# Patient Record
Sex: MALE | Race: WHITE
[De-identification: names, ages, dates, MRNs, and addresses within clinical notes are randomized per-mention and may not be internally consistent; named-entity substitution may affect disease eponyms.]

---

## 2017-04-09 ENCOUNTER — HOSPITAL ENCOUNTER (OUTPATIENT)
Dept: HOSPITAL 53 - M ED | Age: 82
Setting detail: OBSERVATION
LOS: 3 days | Discharge: HOME | End: 2017-04-12
Attending: HOSPITALIST | Admitting: INTERNAL MEDICINE
Payer: MEDICARE

## 2017-04-09 VITALS — WEIGHT: 175.71 LBS | HEIGHT: 72 IN | BODY MASS INDEX: 23.8 KG/M2

## 2017-04-09 DIAGNOSIS — D61.818: Primary | ICD-10-CM

## 2017-04-09 DIAGNOSIS — I50.43: ICD-10-CM

## 2017-04-09 DIAGNOSIS — I35.8: ICD-10-CM

## 2017-04-09 DIAGNOSIS — Z85.828: ICD-10-CM

## 2017-04-09 DIAGNOSIS — N18.3: ICD-10-CM

## 2017-04-09 DIAGNOSIS — Z79.899: ICD-10-CM

## 2017-04-09 DIAGNOSIS — E03.9: ICD-10-CM

## 2017-04-09 DIAGNOSIS — R55: ICD-10-CM

## 2017-04-09 DIAGNOSIS — Z79.02: ICD-10-CM

## 2017-04-09 DIAGNOSIS — R06.02: ICD-10-CM

## 2017-04-09 DIAGNOSIS — K57.30: ICD-10-CM

## 2017-04-09 DIAGNOSIS — I48.91: ICD-10-CM

## 2017-04-09 DIAGNOSIS — I12.9: ICD-10-CM

## 2017-04-09 DIAGNOSIS — E78.4: ICD-10-CM

## 2017-04-09 LAB
ANION GAP SERPL CALC-SCNC: 8 MEQ/L (ref 8–16)
BASOPHILS # BLD AUTO: 0 K/MM3 (ref 0–0.2)
BASOPHILS NFR BLD AUTO: 0.4 % (ref 0–1)
BUN SERPL-MCNC: 25 MG/DL (ref 7–18)
CALCIUM SERPL-MCNC: 8.5 MG/DL (ref 8.8–10.2)
CHLORIDE SERPL-SCNC: 107 MEQ/L (ref 98–107)
CO2 SERPL-SCNC: 27 MEQ/L (ref 21–32)
CREAT SERPL-MCNC: 1.52 MG/DL (ref 0.7–1.3)
EOSINOPHIL # BLD AUTO: 0 K/MM3 (ref 0–0.5)
EOSINOPHIL NFR BLD AUTO: 1.8 % (ref 0–3)
ERYTHROCYTE [DISTWIDTH] IN BLOOD BY AUTOMATED COUNT: 15.4 % (ref 11.5–14.5)
GFR SERPL CREATININE-BSD FRML MDRD: 46.7 ML/MIN/{1.73_M2} (ref 35–?)
GLUCOSE SERPL-MCNC: 139 MG/DL (ref 83–110)
LARGE UNSTAINED CELL #: 0.1 K/MM3 (ref 0–0.4)
LARGE UNSTAINED CELL %: 2.7 % (ref 0–4)
LYMPHOCYTES # BLD AUTO: 0.7 K/MM3 (ref 1.5–4.5)
LYMPHOCYTES NFR BLD AUTO: 20.6 % (ref 24–44)
MCH RBC QN AUTO: 34.1 PG (ref 27–33)
MCHC RBC AUTO-ENTMCNC: 32.5 G/DL (ref 32–36.5)
MCV RBC AUTO: 104.8 FL (ref 80–96)
MONOCYTES # BLD AUTO: 0.4 K/MM3 (ref 0–0.8)
MONOCYTES NFR BLD AUTO: 12.8 % (ref 0–5)
NEUTROPHILS # BLD AUTO: 1.9 K/MM3 (ref 1.8–7.7)
NEUTROPHILS NFR BLD AUTO: 61.7 % (ref 36–66)
PLATELET # BLD AUTO: 131 K/MM3 (ref 150–450)
POTASSIUM SERPL-SCNC: 3.5 MEQ/L (ref 3.5–5.1)
SODIUM SERPL-SCNC: 142 MEQ/L (ref 136–145)
WBC # BLD AUTO: 3.1 K/MM3 (ref 4–10)

## 2017-04-09 PROCEDURE — 82550 ASSAY OF CK (CPK): CPT

## 2017-04-09 PROCEDURE — 80048 BASIC METABOLIC PNL TOTAL CA: CPT

## 2017-04-09 PROCEDURE — 80053 COMPREHEN METABOLIC PANEL: CPT

## 2017-04-09 PROCEDURE — 94760 N-INVAS EAR/PLS OXIMETRY 1: CPT

## 2017-04-09 PROCEDURE — 93041 RHYTHM ECG TRACING: CPT

## 2017-04-09 PROCEDURE — 93880 EXTRACRANIAL BILAT STUDY: CPT

## 2017-04-09 PROCEDURE — 83735 ASSAY OF MAGNESIUM: CPT

## 2017-04-09 PROCEDURE — 85027 COMPLETE CBC AUTOMATED: CPT

## 2017-04-09 PROCEDURE — 83605 ASSAY OF LACTIC ACID: CPT

## 2017-04-09 PROCEDURE — 81001 URINALYSIS AUTO W/SCOPE: CPT

## 2017-04-09 PROCEDURE — 80069 RENAL FUNCTION PANEL: CPT

## 2017-04-09 PROCEDURE — 96374 THER/PROPH/DIAG INJ IV PUSH: CPT

## 2017-04-09 PROCEDURE — 36415 COLL VENOUS BLD VENIPUNCTURE: CPT

## 2017-04-09 PROCEDURE — 93306 TTE W/DOPPLER COMPLETE: CPT

## 2017-04-09 PROCEDURE — 96376 TX/PRO/DX INJ SAME DRUG ADON: CPT

## 2017-04-09 PROCEDURE — 86901 BLOOD TYPING SEROLOGIC RH(D): CPT

## 2017-04-09 PROCEDURE — 83880 ASSAY OF NATRIURETIC PEPTIDE: CPT

## 2017-04-09 PROCEDURE — 36430 TRANSFUSION BLD/BLD COMPNT: CPT

## 2017-04-09 PROCEDURE — 71010: CPT

## 2017-04-09 PROCEDURE — 86920 COMPATIBILITY TEST SPIN: CPT

## 2017-04-09 PROCEDURE — 99285 EMERGENCY DEPT VISIT HI MDM: CPT

## 2017-04-09 PROCEDURE — 84484 ASSAY OF TROPONIN QUANT: CPT

## 2017-04-09 PROCEDURE — 86900 BLOOD TYPING SEROLOGIC ABO: CPT

## 2017-04-09 PROCEDURE — 82553 CREATINE MB FRACTION: CPT

## 2017-04-09 PROCEDURE — 95819 EEG AWAKE AND ASLEEP: CPT

## 2017-04-09 PROCEDURE — 86850 RBC ANTIBODY SCREEN: CPT

## 2017-04-09 PROCEDURE — 93005 ELECTROCARDIOGRAM TRACING: CPT

## 2017-04-09 PROCEDURE — 70450 CT HEAD/BRAIN W/O DYE: CPT

## 2017-04-09 PROCEDURE — 84443 ASSAY THYROID STIM HORMONE: CPT

## 2017-04-09 PROCEDURE — 85025 COMPLETE CBC W/AUTO DIFF WBC: CPT

## 2017-04-09 PROCEDURE — 71275 CT ANGIOGRAPHY CHEST: CPT

## 2017-04-09 NOTE — REPUSA
CT angiogram of the chest

Clinical statement: Chest pain and shortness of breath.

Technique: Multiple axial CT images were obtained from the thoracic inlet through the upper abdomen a
fter a bolus administration of nonionic intravenous contrast. Coronal and sagittal reconstructions we
re also obtained.

Comparison: 11/16/2016.

Findings: The pulmonary arteries are well-opacified with contrast, with no intraluminal filling defec
ts to suggest embolism. The thoracic aorta is unremarkable., Other than moderate atherosclerotic lanier
ges Thyroid gland is within normal limits. There is no thoracic lymphadenopathy. There are moderate s
ized bilateral pleural effusions. The lungs are clear. Limited imaging of the upper abdomen is unrema
rkable. There are no suspicious osseous lesions.

Impression:

1. No evidence of pulmonary embolism.

2. Moderate sized bilateral pleural effusions.

3. Moderate atherosclerosis of the thoracic aorta. No evidence of aneurysm.

     Electronically signed by KAYA TAMEZ MD on 04/09/2017 10:45:15 PM ET

## 2017-04-10 VITALS — SYSTOLIC BLOOD PRESSURE: 120 MMHG | DIASTOLIC BLOOD PRESSURE: 78 MMHG

## 2017-04-10 VITALS — DIASTOLIC BLOOD PRESSURE: 79 MMHG | SYSTOLIC BLOOD PRESSURE: 140 MMHG

## 2017-04-10 VITALS — SYSTOLIC BLOOD PRESSURE: 140 MMHG | DIASTOLIC BLOOD PRESSURE: 56 MMHG

## 2017-04-10 VITALS — DIASTOLIC BLOOD PRESSURE: 52 MMHG | SYSTOLIC BLOOD PRESSURE: 130 MMHG

## 2017-04-10 VITALS — DIASTOLIC BLOOD PRESSURE: 57 MMHG | SYSTOLIC BLOOD PRESSURE: 104 MMHG

## 2017-04-10 VITALS — DIASTOLIC BLOOD PRESSURE: 68 MMHG | SYSTOLIC BLOOD PRESSURE: 132 MMHG

## 2017-04-10 VITALS — DIASTOLIC BLOOD PRESSURE: 66 MMHG | SYSTOLIC BLOOD PRESSURE: 139 MMHG

## 2017-04-10 VITALS — SYSTOLIC BLOOD PRESSURE: 127 MMHG | DIASTOLIC BLOOD PRESSURE: 58 MMHG

## 2017-04-10 VITALS — SYSTOLIC BLOOD PRESSURE: 142 MMHG | DIASTOLIC BLOOD PRESSURE: 81 MMHG

## 2017-04-10 VITALS — SYSTOLIC BLOOD PRESSURE: 115 MMHG | DIASTOLIC BLOOD PRESSURE: 68 MMHG

## 2017-04-10 VITALS — DIASTOLIC BLOOD PRESSURE: 81 MMHG | SYSTOLIC BLOOD PRESSURE: 133 MMHG

## 2017-04-10 VITALS — DIASTOLIC BLOOD PRESSURE: 68 MMHG | SYSTOLIC BLOOD PRESSURE: 115 MMHG

## 2017-04-10 VITALS — SYSTOLIC BLOOD PRESSURE: 116 MMHG | DIASTOLIC BLOOD PRESSURE: 64 MMHG

## 2017-04-10 LAB
ALBUMIN SERPL BCG-MCNC: 3.4 GM/DL (ref 3.2–5.2)
ALBUMIN/GLOB SERPL: 1.03 {RATIO} (ref 1–1.93)
ALP SERPL-CCNC: 87 U/L (ref 45–117)
ALT SERPL W P-5'-P-CCNC: 41 U/L (ref 12–78)
ANION GAP SERPL CALC-SCNC: 8 MEQ/L (ref 8–16)
AST SERPL-CCNC: 24 U/L (ref 15–37)
BILIRUB SERPL-MCNC: 0.6 MG/DL (ref 0.2–1)
BUN SERPL-MCNC: 21 MG/DL (ref 7–18)
CALCIUM SERPL-MCNC: 8.8 MG/DL (ref 8.8–10.2)
CHLORIDE SERPL-SCNC: 107 MEQ/L (ref 98–107)
CO2 SERPL-SCNC: 29 MEQ/L (ref 21–32)
CREAT SERPL-MCNC: 1.67 MG/DL (ref 0.7–1.3)
ERYTHROCYTE [DISTWIDTH] IN BLOOD BY AUTOMATED COUNT: 16.5 % (ref 11.5–14.5)
GFR SERPL CREATININE-BSD FRML MDRD: 41.9 ML/MIN/{1.73_M2} (ref 35–?)
GLUCOSE SERPL-MCNC: 130 MG/DL (ref 83–110)
MAGNESIUM SERPL-MCNC: 2.3 MG/DL (ref 1.8–2.4)
MCH RBC QN AUTO: 33.2 PG (ref 27–33)
MCHC RBC AUTO-ENTMCNC: 33.6 G/DL (ref 32–36.5)
MCV RBC AUTO: 98.9 FL (ref 80–96)
PLATELET # BLD AUTO: 142 K/MM3 (ref 150–450)
POTASSIUM SERPL-SCNC: 3.6 MEQ/L (ref 3.5–5.1)
PROT SERPL-MCNC: 6.7 GM/DL (ref 6.4–8.2)
SODIUM SERPL-SCNC: 144 MEQ/L (ref 136–145)
WBC # BLD AUTO: 3.7 K/MM3 (ref 4–10)

## 2017-04-10 RX ADMIN — LEVOTHYROXINE SODIUM SCH MG: 25 TABLET ORAL at 05:24

## 2017-04-10 RX ADMIN — RIVAROXABAN SCH MG: 15 TABLET, FILM COATED ORAL at 20:55

## 2017-04-10 RX ADMIN — DOCUSATE SODIUM,SENNOSIDES SCH TAB: 50; 8.6 TABLET, FILM COATED ORAL at 09:05

## 2017-04-10 RX ADMIN — SODIUM CHLORIDE, PRESERVATIVE FREE SCH ML: 5 INJECTION INTRAVENOUS at 13:12

## 2017-04-10 RX ADMIN — DOCUSATE SODIUM,SENNOSIDES SCH TAB: 50; 8.6 TABLET, FILM COATED ORAL at 20:55

## 2017-04-10 RX ADMIN — MULTIPLE VITAMINS W/ MINERALS TAB SCH TAB: TAB at 09:05

## 2017-04-10 RX ADMIN — FUROSEMIDE SCH MG: 10 INJECTION, SOLUTION INTRAMUSCULAR; INTRAVENOUS at 17:10

## 2017-04-10 RX ADMIN — METOPROLOL SUCCINATE SCH MG: 25 TABLET, EXTENDED RELEASE ORAL at 09:05

## 2017-04-10 RX ADMIN — SODIUM CHLORIDE, PRESERVATIVE FREE SCH ML: 5 INJECTION INTRAVENOUS at 20:55

## 2017-04-10 RX ADMIN — ATORVASTATIN CALCIUM SCH MG: 10 TABLET, FILM COATED ORAL at 09:05

## 2017-04-10 NOTE — CR
DATE OF CONSULTATION:  04/10/2017

 

REFERRING PHYSICIAN:  Ezekiel Solis MD

 

INDICATION:  Near syncope.

 

HISTORY OF PRESENT ILLNESS:  Mr. Willett is known to me.  He is a very pleasant

84-year-old man who has known chronic atrial fibrillation and valvular heart

disease.  He was admitted to [facility] ([facility]) yesterday evening

after he suffered a near syncopal event at home.  He apparently was watching

television. While sitting he was feeling well. Then he stood up and as he did 
he suddenly 

experienced dizziness, chest discomfort and shortness of breath.  He sat back 
down on 

a chair but apparently it did not prevent him from almost losing consciousness.
  

His wife witnessed the episode.  He supposedly was rolling his eyes back and 
was 

gasping for air so she called an ambulance.  By the time they arrived he was 
already feeling 

a little better.  The chest discomfort essentially completely resolved by the 
time ambulance 

arrived, but he still was somewhat incoherent and short of breath.  They 
applied oxygen and

placed IV and by the time he reached emergency room there was already marked

improvement in his condition.  Reportedly the initial blood pressure reading by

the ambulance was very low.  I was unable to find documentation in the chart.

After arrival to emergency room he underwent a variety of tests.  He was found 
to

be in atrial fibrillation on ECG which is known to be chronic, the heart rate 
was

reasonably well-controlled.  The CT of the chest looking for pulmonary embolism

was negative for pulmonary embolism but revealed bilateral pleural effusions.  
He

was also found to be markedly anemic and since yesterday received 2 units of

packed red blood cells.  Today he tells me that he is feeling much better but

still tired and exhausted.  So far the telemetry monitoring did not reveal any

extreme bradycardia or tachycardia.

 

PAST MEDICAL HISTORY:

1.  Valvular heart disease.  He had an echocardiogram in 2017 in our 
office

which revealed left ventricle ejection fraction about 50-55%.  There was 
moderate

aortic insufficiency and moderately severe mitral insufficiency and at least 
mild

pulmonary hypertension.

2.  The patient does not have established coronary artery disease.  Last

evaluation for ischemia was an exercise stress test in 2013 which was

negative at 5 metabolic equivalents.

3.  Chronic anemia, under care of hematology service.  Based on the available

information it appears to me that he most likely has myelodysplastic syndrome.

There is no history of gastrointestinal (GI) bleeding.

4.  Hypertension.

5.  Hypothyroidism.

6.  Chronic atrial fibrillation since at least .

 

SURGICAL HISTORY:  Positive for hernia repair and skin cancer resections.  He 
had

several colonoscopies.

 

OUTPATIENT MEDICATIONS:

- furosemide 40 mg a day

- levothyroxine 25 mcg a day

- Lipitor 10 a day

- stool softener

- Toprol XL as needed once daily for heart rate over 70 beats per minute

- Xarelto 50 mg a day

- multivitamin

 

ALLERGIES:  No medication allergies.

 

FAMILY HISTORY:  Negative for early coronary artery disease (CAD) in first-
degree

relatives.  His mother  of noncardiac issues in advanced age.

 

SOCIAL HISTORY:  The patient is  and lives with his wife.  He is retired.

There is no history of drug use.  He used to smoke but quit in .  No

significant alcohol use.

 

REVIEW OF SYSTEMS:

Prior to the onset of symptoms, he has not had any unusual events lately.

Specifically denies any recent fever, chills, nausea, vomiting, diarrhea.  There

is no history of stroke.  There is no history of chest discomfort until the one

he had during the episode.  He does have chronic shortness of breath of variable

degree.  He does have occasional dizziness with ambulation.  No peripheral 
edema.

No recent syncope or near-syncope.  The rest of review of systems is negative.

 

PHYSICAL EXAMINATION:

Reveals elderly man that does not appear to be chronically ill or acutely ill.

He lays in hospital bed.  Last set of vital signs:  Blood pressure 140/56, heart

rate is 70s, he is afebrile, saturation is 97% on room air.  His jugular venous

pressure (JVP) is not elevated.  Lungs are relatively clear to auscultation

although there are diminished breath sounds over both bases.  Heart exam reveals

irregular rhythm.  There is a blowing murmur best heard in the axilla about 3/6

intensity and there is also murmur over the aortic valve.  I do not appreciate a

diastolic murmur of aortic insufficiency.  Abdomen is soft, nontender.  No

hepatosplenomegaly is appreciated.  There is trace peripheral edema.  Peripheral

pulses are palpable.  Neurologically he is alert and oriented.  He does have

occasional difficulty finding words.  No focal deficit as far as muscle strength

is concerned.

 

LABORATORY DATA:

As of today CBC reveals hemoglobin 8.9, hematocrit 26 and platelet count 142,
000.

Basic metabolic panel:  Potassium 3.6 BUN 21, creatinine 1.7, and glucose 130.

Normal liver function tests.  Normal cardiac enzymes and albumin is 3.4.

 

IMAGING:

He had bilateral carotid ultrasound that revealed less than 50% bilateral

stenosis.

 

He had a head CT that did not reveal any acute process.  There is chronic 
atrophy

and small-vessel changes consistent with his age.

 

CT angiography of the chest evidenced bilateral pleural effusions and

cardiomegaly but no pulmonary embolism.  Similar is for chest x-ray.

 

ECG reveals atrial fibrillation which is known to be chronic.  There is

occasional ventricular ectopy, nonspecific IVCD, and chronic repolarization

abnormalities.

 

ASSESSMENT/PLAN:

Mr. Willett is an 84-year-old man who has chronic atrial fibrillation and 
likely

myelodysplastic syndrome who presented with chest pain, shortness of breath, and

near syncope when he tried to stand up.  The history sounds orthostatic with 
very

likely strong contribution of his anemia.  Nevertheless with chronic atrial

fibrillation, I certainly cannot rule out component of arrhythmia and

consequently I do recommend that the patient gets monitored on telemetry for

minimum of 2-3 days.  I also would want to make sure that before he gets

discharged home he is able to ambulate and also that he maintains his 
hemoglobin.

So far the dose of diuretics was doubled which I think is appropriate.  It is

undoubtedly due to combination of severe anemia with valvular heart disease.

Unfortunately I do not believe that the patient is a candidate for aortic and

mitral valve replacement and consequently we will be left with purely medical

management.  I would continue chronic anticoagulation. Even though he is anemic

there is no evidence for bleeding.  Finally, the patient is DO NOT INTUBATE/DO

NOT RESUSCITATE.

MTDD

## 2017-04-10 NOTE — REP
AP PORTABLE CHEST:  04/09/2017.

 

Comparison CT and portable chest 11/16/2016.

 

Clinical history: Syncope.

 

Findings:  Lungs are hyperinflated with some underlying interstitial fibrotic

changes.  There is cardiomegaly with left atrial and ventricular enlargement.

There is some venous hypertension but no pulmonary edema or dense consolidation.

Colonic interposition between the dome of the diaphragm and the liver.  This is

an anatomic variation.  The aorta is calcified at the arch slightly tortuous

without aneurysm and unchanged.  Degenerative changes in the spine and

shoulders.

 

Impression:

 

1.  Cardiomegaly with some venous hypertension but no pulmonary edema, definite

effusion or acute infiltrate.

 

2.  Colonic interposition between the liver and dome of the diaphragm as before.

 

 

Signed by

Eligio Mendieta MD 04/10/2017 05:11 P

## 2017-04-10 NOTE — ECGEPIP
Stationary ECG Study

                           [facility] - ED

                                       

                                       Test Date:    2017

Pat Name:     KATYA TAI          Department:   

Patient ID:   [id number]                 Room:         -

Gender:       M                        Technician:   

:          1933               Requested By: JAYDEN PEREZ

Order Number: [id number]     Reading MD:   Ancelmo Arriola

                                 Measurements

Intervals                              Axis          

Rate:         90                       P:            

MO:           0                        QRS:          -15

QRSD:         110                      T:            124

QT:           401                                    

QTc:          493                                    

                           Interpretive Statements

ATRIAL FIBRILLATION WITH VENTRICULAR PREMATURE COMPLEXES

MODERATE INTRAVENTRICULAR CONDUCTION DELAY

MINIMAL VOLTAGE CRITERIA FOR LVH, CONSIDER NORMAL VARIANT

NONSPECIFIC ST & T-WAVE ABNORMALITY

SIMILAR TO 16

Electronically Signed On 4- 5:48:47 EDT by Ancelmo Arriola

## 2017-04-10 NOTE — REP
CT BRAIN WITHOUT CONTRAST:  04/10/2017

 

CLINICAL HISTORY:  Syncope.

 

COMPARISON:  11/16/2016

 

FINDINGS:  Ventricles are midline, symmetric, and normal size for mild diffuse

atrophy.  There is no midline shift.  Third and fourth ventricles are also

unremarkable.  There is mild atrophy, greatest in the temporal lobes but present

throughout.  There is heterogeneous low attenuation white matter change in the

periventricular deep and subcortical white matter tracts bilaterally representing

small vessel ischemic change.  This is stable.  Cortical stripe is preserved

otherwise with no vascular territory infarct, hemorrhage, mass, mass effect, or

edema.  No extra-axial fluid collection.  Brainstem unremarkable.  Cerebellum

shows atrophy without focal lesion.  The mastoids and visualized sinuses are

clear.  The calvarium and skull base show no fracture or focal lesion.

 

IMPRESSION:

 

1.  Chronic small vessel white matter ischemic changes noted, unchanged.  No

acute infarct, hemorrhage, edema, or mass.

 

2.  Mild atrophy with proportionate ventricular size without change.

 

3.  Sinuses, mastoids, skull base, and calvarium intact.  Stable examination from

11/16/2016.

 

 

Signed by

Eligio Mendieta MD 04/10/2017 05:14 P

## 2017-04-10 NOTE — REP
CAROTID ULTRASOUND:

 

Real-time ultrasound evaluation and duplex Doppler interrogation of the

extracranial carotid vasculature is performed.  There is mild plaquing and

narrowing in both carotid bulbs extending into the internal and external carotid

arteries.  Luminal narrowing is less than 50%.  There is no evidence of

hemodynamically significant stenosis of either internal carotid artery.  Normal

flow velocities are seen. The vertebral arteries demonstrate normal direction of

flow.

 

                                                                RIGHT

LEFT

 

Peak systolic velocity ICA                   80.7 cm/s                      84.1

cm/s

 

End diastolic velocity ICA                  26.4 cm/s                      34.5

cm/s

 

Peak systolic velocity CCA                  66.8 cm/s                     79.3

cm/s

 

Peak systolic velocity ECA                  60.5 cm/s                     69.6

cm/s

 

ICA/CCA ratio                              1.2                              1.06

 

IMPRESSION:

 

Bilateral luminal narrowing of the internal carotid arteries less than 50%.  No

evidence of hemodynamically significant stenosis.

 

 

Signed by

Bowen Muñiz MD 04/10/2017 03:44 P

## 2017-04-11 VITALS — DIASTOLIC BLOOD PRESSURE: 60 MMHG | SYSTOLIC BLOOD PRESSURE: 134 MMHG

## 2017-04-11 VITALS — DIASTOLIC BLOOD PRESSURE: 63 MMHG | SYSTOLIC BLOOD PRESSURE: 131 MMHG

## 2017-04-11 VITALS — DIASTOLIC BLOOD PRESSURE: 57 MMHG | SYSTOLIC BLOOD PRESSURE: 100 MMHG

## 2017-04-11 VITALS — DIASTOLIC BLOOD PRESSURE: 66 MMHG | SYSTOLIC BLOOD PRESSURE: 115 MMHG

## 2017-04-11 VITALS — SYSTOLIC BLOOD PRESSURE: 125 MMHG | DIASTOLIC BLOOD PRESSURE: 56 MMHG

## 2017-04-11 LAB
ANION GAP SERPL CALC-SCNC: 7 MEQ/L (ref 8–16)
BUN SERPL-MCNC: 20 MG/DL (ref 7–18)
CALCIUM SERPL-MCNC: 8.5 MG/DL (ref 8.8–10.2)
CHLORIDE SERPL-SCNC: 109 MEQ/L (ref 98–107)
CO2 SERPL-SCNC: 29 MEQ/L (ref 21–32)
CREAT SERPL-MCNC: 1.51 MG/DL (ref 0.7–1.3)
ERYTHROCYTE [DISTWIDTH] IN BLOOD BY AUTOMATED COUNT: 16.2 % (ref 11.5–14.5)
GFR SERPL CREATININE-BSD FRML MDRD: 47.1 ML/MIN/{1.73_M2} (ref 35–?)
GLUCOSE SERPL-MCNC: 105 MG/DL (ref 83–110)
MCH RBC QN AUTO: 33.7 PG (ref 27–33)
MCHC RBC AUTO-ENTMCNC: 33.9 G/DL (ref 32–36.5)
MCV RBC AUTO: 99.2 FL (ref 80–96)
PLATELET # BLD AUTO: 135 K/MM3 (ref 150–450)
POTASSIUM SERPL-SCNC: 3.6 MEQ/L (ref 3.5–5.1)
SODIUM SERPL-SCNC: 145 MEQ/L (ref 136–145)
WBC # BLD AUTO: 3.9 K/MM3 (ref 4–10)

## 2017-04-11 RX ADMIN — SODIUM CHLORIDE, PRESERVATIVE FREE SCH ML: 5 INJECTION INTRAVENOUS at 16:38

## 2017-04-11 RX ADMIN — DOCUSATE SODIUM,SENNOSIDES SCH TAB: 50; 8.6 TABLET, FILM COATED ORAL at 08:59

## 2017-04-11 RX ADMIN — RIVAROXABAN SCH MG: 15 TABLET, FILM COATED ORAL at 20:28

## 2017-04-11 RX ADMIN — FUROSEMIDE SCH MG: 10 INJECTION, SOLUTION INTRAMUSCULAR; INTRAVENOUS at 16:38

## 2017-04-11 RX ADMIN — DOCUSATE SODIUM,SENNOSIDES SCH TAB: 50; 8.6 TABLET, FILM COATED ORAL at 20:28

## 2017-04-11 RX ADMIN — SODIUM CHLORIDE, PRESERVATIVE FREE SCH ML: 5 INJECTION INTRAVENOUS at 20:28

## 2017-04-11 RX ADMIN — MULTIPLE VITAMINS W/ MINERALS TAB SCH TAB: TAB at 08:58

## 2017-04-11 RX ADMIN — FUROSEMIDE SCH MG: 10 INJECTION, SOLUTION INTRAMUSCULAR; INTRAVENOUS at 08:58

## 2017-04-11 RX ADMIN — LEVOTHYROXINE SODIUM SCH MG: 25 TABLET ORAL at 05:01

## 2017-04-11 RX ADMIN — SODIUM CHLORIDE, PRESERVATIVE FREE SCH ML: 5 INJECTION INTRAVENOUS at 05:01

## 2017-04-11 RX ADMIN — METOPROLOL SUCCINATE SCH MG: 25 TABLET, EXTENDED RELEASE ORAL at 08:58

## 2017-04-11 RX ADMIN — ATORVASTATIN CALCIUM SCH MG: 10 TABLET, FILM COATED ORAL at 08:59

## 2017-04-11 NOTE — ECHO
DATE OF PROCEDURE: 04/11/2017

 

REFERRING PHYSICIAN: Dr. Solis and Dr. Nogueira.

 

INDICATIONS: Syncope and mitral and aortic valve disease.

 

HEIGHT: 183 cm

WEIGHT: 93 kg

 

DIMENSIONS:

IVS: 1.3

LV: 6.1

LVPW: 1.3

LA: 4.5

AORTA: 4.1

 

FINDINGS: The study is of acceptable technical quality with excellent apical

views, acceptable parasternal views but poor subcostal views.  Left ventricle is

mildly dilated and mildly globally hypokinetic.  I estimate ejection fraction

(EF) around 45-50%.  Right ventricle is normal size.  Both atria are severely

enlarged.  Aortic valve is sclerotic but it has 3 cusps and normal mobility.

There is prolapse of principally anterior mitral leaflet that appears fairly 
mild

by 2-D imaging.  The valve itself has preserved mobility.  Tricuspid valve

appears normal.  Pulmonic valve also appears normal.  No pericardial effusion is

noted.  Inferior vena cava is not visualized.  Aortic root is dilated at 4.1 cm.

Aortic arch and abdominal aorta were not seen.

 

Doppler interrogation of aortic valve reveals no significant stenosis and

approximately moderate insufficiency.  There is no significant mitral stenosis

but severe mitral insufficiency with a torrential MR. Tricuspid valve is mildly

insufficient.  Calculated pulmonary artery pressure is in 30s or 40s depending 
on

central venous pressure (CVP) corresponding to mild or moderate pulmonary

hypertension.  Pulmonic valve is also mildly insufficient.

 

Diastolic function is not evaluated due to presence of atrial fibrillation.

 

CONCLUSIONS:

1.  Study is of good technical quality.

2.  Dilated left ventricle with mild left ventricular hypertrophy (LVH) and mild

global hypokinesis.

3.  Severe mitral insufficiency due to mitral valve prolapse.

4.  Moderate aortic insufficiency.

5.  Unable to estimate CVP.

6.  Mild or moderate pulmonary artery pressure pulmonary hypertension.

7.  Severe biatrial enlargement.

8.  Dilated aortic root (4.1 cm)

 

COMMENT: Subacute bacterial endocarditis (SBE) prophylaxis is not recommended.

MTDD

## 2017-04-11 NOTE — ECGEPIP
Stationary ECG Study

                              [facility]

                                       

                                       Test Date:    2017

Pat Name:     KATYA TAI          Department:   

Patient ID:   [id number]                 Room:         [place] [address]

Gender:       M                        Technician:   GENEVIEVE

:          1933               Requested By: Roxana Flores 

Order Number: [id number]     Reading MD:   Eddy Kim

                                 Measurements

Intervals                              Axis          

Rate:         89                       P:            

NV:           0                        QRS:          -23

QRSD:         105                      T:            120

QT:           355                                    

QTc:          434                                    

                           Interpretive Statements

Atrial fibrillation with controlled ventricular response

LVH by Pete criteria

Could not rule out prior IWMI.

ST/T-wave abnormalities

No significant change from 17

Electronically Signed On 2017 10:24:44 EDT by Eddy Kim

## 2017-04-11 NOTE — IPN
DATE:  04/11/2017

 

Mr. Willett had a relatively uneventful day yesterday.  He feels much better

this morning.  He was able to ambulate in PCU without major difficulty.

 

Blood pressure this morning 138/81, heart rate is from 80s to low 100s.  He is

afebrile.  Saturation 98% on room air.  His fluid balance yesterday was about 800

mL negative but weight is unchanged.  He is alert and oriented and appropriate.

His JVP is not high.  Lungs sound pretty clear to auscultation with some

diminished breath sounds over both bases but not very much.  Heart exam reveals

irregularly irregular rhythm.  There are unchanged murmur over both aortic and

mitral valves.  Abdomen is soft, nontender.  No peripheral edema.  Neurologically

intact.

 

LABORATORY: Hemoglobin is 9, hematocrit 26.6 and platelet 135,000.  Basic

metabolic panel: Potassium 3.6, BUN 20, creatinine 1.5, glucose 105.  .

 

ASSESSMENT/PLAN: Mr. Willett is an 84-year-old man who has chronic atrial

fibrillation and no mitral and aortic valve disease.  He presented with

near-syncope I believe to great degree due to underlying anemia due to most

likely myelodysplastic syndrome.  I still would recommend to monitor him one more

day to make sure we will not detect any extreme carlos or tachycardia but it

appears that it will not be the case.  Provided he continues to feel well, he can

be discharged home tomorrow.

## 2017-04-11 NOTE — EEG
DATE OF PROCEDURE:  04/10/2017

 

REFERRING PROVIDER:  Dr. Solis

 

DIAGNOSIS:  Syncope.

 

EEG NUMBER:  

 

HISTORY:  The patient is an 84-year-old man who was admitted at [facility] after an episode of dozing and complaining of chest pain.  He had

difficulty of breathing and could not talk and his eyes and head rolled back.

According to the patient's wife, the patient almost passed out.  This EEG was

done to rule out epileptic potential.  He is currently taking metoprolol,

levothyroxine, rivaroxaban, Lipitor, Lasix

 

TECHNICAL DESCRIPTION:  This digital EEG was recorded by 21 scalp, ear and two

EKG electrodes and was reviewed in bipolar and referential montages following

reformatting in 10-20 [employer] Electrode [employer]acement System.

 

INTERPRETATION

The patient was noted to be in awake and drowsy states during this EEG.  Resting

awake background consisted of well-formed posterior dominant rhythm with

anterior/posterior gradient comprising of 9 Hz alpha activity measuring 15 - 40

microvolts in amplitude, which was symmetric and reactive to eye opening.

Anteriorly, low voltage mixed frequencies were noted.  Hyperventilation could not

be performed.  Stage I and II sleep were reviewed and were symmetric bilaterally.

Photic stimulation remained unremarkable.  EKG revealed irregular heartbeat and

atrial fibrillation.  No focal, lateralizing or epileptiform abnormalities were

seen.  No clinical or electrographic seizures were recorded.

 

CONCLUSION:  This EEG in awake and drowsy states, stage I and II sleep is within

normal limits.

## 2017-04-11 NOTE — IPN
DATE:  04/11/2017

 

84-year-old gentleman seen at bedside today with no overnight issues.  He is

resting comfortably.  Will see how he does with physical therapy today.  Denies

chest pain, shortness of breath or palpitations.  No nausea or vomiting.

Tolerating his breakfast meal currently.

 

OBJECTIVE:  Temperature is 97.7, pulse 98, respiratory rate 18, blood pressure

(BP) 138/81, SPO2 is 98% on room air.

Ins and outs:  2290/3060, negative fluid balance of 770.

HEENT:  Unremarkable.

Lungs:  Clear.

Heart:  Regular rate and rhythm.

Abdomen:  Soft.

Extremities:  No edema.  No calf tenderness.

 

LABORATORIES:  White count is 3.9, hemoglobin 9.0 up from 8.9, platelets are

135,000.  Sodium 145, potassium 3.6, chloride 109, bicarb 29, anion gap 7, BUN is

20, creatinine is 1.51 down from 1.67, glucose is 105, calcium 8.5.  Cardiac

enzymes were cycled and negative.  Troponin was less than 0.10.  BNP was 312.

 

Carotid ultrasound shows bilateral luminal narrowing less than 50%.  No evidence

of hemodynamically significant stenosis.

 

2-D echo is pending at this time.

 

His head CT has chronic ischemic changes.  No acute infarct or hemorrhage.  Mild

atrophy which appears to be associated with age.

 

CT angio of the chest on admission was negative for pulmonary embolism.  It did

have moderate sized bilateral pleural effusions, otherwise no acute findings.

 

His chest x-ray on admission had cardiomegaly noted.  No acute infiltrate.

 

ASSESSMENT/PLAN:

1.  Acute on chronic anemia, appears to be stable.  Will continue to follow his

hemoglobin and hematocrit (H and H).  If he is doing well tomorrow, anticipate

discharge.

2.  Atrial fibrillation, rate controlled.  Appreciate Dr. Flores's input.

3.  Hypertension, stable.

4.  Hyperlipidemia.  Continue on Lipitor.

4.  Hypothyroidism.  Continue on current dose of Synthroid.

5.  History of skin cancer, both basal cell and squamous cell.  Should followup

with his outpatient providers.

6.  Diverticulosis, currently stable.

7.  History of internal hemorrhoids.  No current issues.

8.  History of tubular adenoma of the colon.  He should keep outpatient followup

with periodic colonoscopies.

9.  Chronic kidney disease (CKD) stage III, appears to be at baseline.

10.  History of congestive heart failure (CHF) with systolic dysfunction and

ejection fraction of 45-50%.  He appears to be compensated.

11.  Aortic regurgitation with mild mitral regurgitation.  No further issues.

Appreciate Dr. Flores's input.

12.  Pancytopenia.  He does have an underlying history of myelodysplastic

syndrome.  Apparently follows with Dr. Lynch and will try to obtain her last

office visit.  He will need ongoing follow up with her as well as Dr. Flores and

determination whether or not he needs periodic transfusions.

13.  Deep vein thrombosis (DVT) prophylaxis.  We did hold on any heparin

products.  Continue with thromboembolic deterrent stockings (TEDs) and

sequentials and encouraged to ambulate as tolerated.

 

DISPOSITION:  Anticipate discharge home tomorrow.

## 2017-04-12 VITALS — SYSTOLIC BLOOD PRESSURE: 133 MMHG | DIASTOLIC BLOOD PRESSURE: 75 MMHG

## 2017-04-12 VITALS — SYSTOLIC BLOOD PRESSURE: 124 MMHG | DIASTOLIC BLOOD PRESSURE: 64 MMHG

## 2017-04-12 VITALS — SYSTOLIC BLOOD PRESSURE: 109 MMHG | DIASTOLIC BLOOD PRESSURE: 70 MMHG

## 2017-04-12 VITALS — DIASTOLIC BLOOD PRESSURE: 70 MMHG | SYSTOLIC BLOOD PRESSURE: 109 MMHG

## 2017-04-12 VITALS — DIASTOLIC BLOOD PRESSURE: 59 MMHG | SYSTOLIC BLOOD PRESSURE: 115 MMHG

## 2017-04-12 LAB
ALBUMIN SERPL BCG-MCNC: 3.5 GM/DL (ref 3.2–5.2)
ANION GAP SERPL CALC-SCNC: 7 MEQ/L (ref 8–16)
BUN SERPL-MCNC: 24 MG/DL (ref 7–18)
CALCIUM SERPL-MCNC: 8.8 MG/DL (ref 8.8–10.2)
CHLORIDE SERPL-SCNC: 104 MEQ/L (ref 98–107)
CO2 SERPL-SCNC: 31 MEQ/L (ref 21–32)
CREAT SERPL-MCNC: 1.55 MG/DL (ref 0.7–1.3)
ERYTHROCYTE [DISTWIDTH] IN BLOOD BY AUTOMATED COUNT: 15.8 % (ref 11.5–14.5)
GFR SERPL CREATININE-BSD FRML MDRD: 45.7 ML/MIN/{1.73_M2} (ref 35–?)
GLUCOSE SERPL-MCNC: 118 MG/DL (ref 83–110)
MAGNESIUM SERPL-MCNC: 2.5 MG/DL (ref 1.8–2.4)
MCH RBC QN AUTO: 33 PG (ref 27–33)
MCHC RBC AUTO-ENTMCNC: 33 G/DL (ref 32–36.5)
MCV RBC AUTO: 99.9 FL (ref 80–96)
PHOSPHATE SERPL-MCNC: 2.9 MG/DL (ref 2.5–4.9)
PLATELET # BLD AUTO: 164 K/MM3 (ref 150–450)
POTASSIUM SERPL-SCNC: 3.6 MEQ/L (ref 3.5–5.1)
SODIUM SERPL-SCNC: 142 MEQ/L (ref 136–145)
WBC # BLD AUTO: 4.7 K/MM3 (ref 4–10)

## 2017-04-12 RX ADMIN — SODIUM CHLORIDE, PRESERVATIVE FREE SCH ML: 5 INJECTION INTRAVENOUS at 06:15

## 2017-04-12 RX ADMIN — MULTIPLE VITAMINS W/ MINERALS TAB SCH TAB: TAB at 09:08

## 2017-04-12 RX ADMIN — DOCUSATE SODIUM,SENNOSIDES SCH TAB: 50; 8.6 TABLET, FILM COATED ORAL at 09:08

## 2017-04-12 RX ADMIN — ATORVASTATIN CALCIUM SCH MG: 10 TABLET, FILM COATED ORAL at 09:08

## 2017-04-12 RX ADMIN — METOPROLOL SUCCINATE SCH MG: 25 TABLET, EXTENDED RELEASE ORAL at 09:09

## 2017-04-12 RX ADMIN — FUROSEMIDE SCH MG: 10 INJECTION, SOLUTION INTRAMUSCULAR; INTRAVENOUS at 09:08

## 2017-04-12 RX ADMIN — LEVOTHYROXINE SODIUM SCH MG: 25 TABLET ORAL at 06:14

## 2017-04-12 NOTE — DSES
DATE OF ADMISSION:  04/10/2017

DATE OF DISCHARGE:  04/12/2017

 

PRIMARY CARE PROVIDER:  Dr. Arsenio Velez Jr.

 

ONCOLOGIST:  Dr. Chuyita Lynch

 

CARDIOLOGIST:  Dr. Roxana Flores

 

GASTROENTEROLOGIST:  Dr. John Muniz

 

PROCEDURES:  None.

 

CONSULTANTS:  Dr. Roxana Flores

 

COMPLICATIONS:  None.

 

ADMISSION/DISCHARGE DIAGNOSES:

1.  Acute on chronic anemia, now appears to be stable.

2.  Atrial fibrillation, rate controlled.

3.  Hypertension.

4.  Hyperlipidemia.

5.  Hypothyroidism.

6.  History of skin cancer, both basal cell and squamous cell.

7.  Diverticulosis, stable.

8.  History of internal hemorrhoids, non-problematic.

9.  History of tubular adenoma of the colon.

10.  Chronic kidney disease (CKD) stage III.

11.  History of congestive heart failure (CHF) with ejection fraction of 45-50%,

appears compensated.

12.  Aortic regurgitation, mild mitral regurgitation.

13.  Pancytopenia.

 

BRIEF HOSPITAL COURSE:  Mr. Willett is a pleasant 84-year-old gentleman that

follows with Dr. Flores, presented to the emergency department on 04/10/2017,

inability to talk due to shortness of breath, chest discomfort and confusion and

the family had felt that he had had a syncopal episode at home.  Was brought in

by emergency medical service (EMS), systolic blood pressure in the 80s.

According to his wife was noted to be in atrial fibrillation with ventricular

rate in the 90s to low 100s and had anemia that was noted with hemoglobin of 
7.2.

He was admitted.  Repeat echo was ordered.  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 
on

previous workup for his chronic anemia and follows with Dr. Lynch.  He did

receive 2 units of packed red blood cells and his hemoglobin and hematocrit has

remained stable.  His echo did show quite a bit of regurgitation of the mitral

valve and Dr. Flores has advised that may need outpatient followup for further

evaluation at a later date.  At any rate the patient does appear to be doing 
well

today, appears to be back to his baseline and appropriate for discharge.  Please

refer to the history and physical (H P) and consult notes as well as progress

notes in the chart.

 

PHYSICAL EXAMINATION:  Today:

Temperature is 97.8, pulse 94, respiratory rate 18, blood pressure 124/64, SpO2

is 96% on room air.

GENERAL:  The patient appears to be in no acute distress.  Is alert, oriented.

HEENT: unremarkable.

HEART:  Irregularly regular.

ABDOMEN:  Soft.

EXTREMITIES:  No edema.  No calf tenderness.

 

LABORATORY DATA:  White count 4.7, hemoglobin 9.7, platelets 164,000, sodium 142
,

potassium 3.6, chloride 104, bicarbonate 31, anion gap 7, BUN 24, creatinine

1.55, glucose 118, calcium 8.8, magnesium 2.5, albumin 3.5.

 

DISCHARGE CONDITION:  Good.

 

DISPOSITION:  Discharge to home.  Followup with Dr. Velez in a week.  See Dr. Flores within the next week and keep regular appointments with Dr. Lynch.

Activity as tolerated.  Regular diet.  He is ambulating well without any

assistance today and he likely will need further followup and could be arranged

with outpatient transfusions through either his primary care, Dr. Flores, or Dr. Lynch, to hopefully prevent his need for readmission related to anemia.  He will

need to followup with Dr. Flores as indicated above and likely will need an

outpatient referral to [facility], which Dr. Flores did inform me that he

will try to facilitate on his own for the patient.

 

DISCHARGE MEDICATIONS:

- Lipitor 10 mg daily

- Lasix 40 mg daily

- Synthroid 25 mcg daily

- Toprol XL 25 mg daily, but to hold for heart rate less than 60

- multivitamin daily

- Xarelto 15 mg nightly

 

ADDITIONAL DISCHARGE INSTRUCTIONS:  The patient is instructed to seek medical

attention if symptoms should worsen or progress.  He voices understanding.

 

Discharge took approximately 35 minutes.

[facility]

## 2017-04-12 NOTE — IPN
DATE:  04/12/2017

 

Mr. Willett has done quite well since yesterday.  He was able to ambulate 
around

PCU numerous times with his wife and has no trouble doing so.  He denies any

chest pain or sensation of palpitations.  Telemetry monitoring revealed atrial

fibrillation.  He usually is well rate-controlled at rest, but with ambulation

get into 120s and 130s, which I would consider relatively appropriate.  He had

one six beat run of nonsustained VT that was relatively slow and asymptomatic.

 

Vital signs his blood pressure was 109/70, heart rate as above.  He is afebrile.

Saturation 98% on room air.  Fluid balance yesterday was about liter negative.

Weight is documented 79.7, which is not likely to be accurate.  He is alert and

oriented and appropriate.  His JVP is not elevated.  Lungs are surprisingly 
clear

to auscultation with good air movement.  I do not appreciate diminished breath

sounds today.  Heart exam is unchanged, irregular rhythm.  There is both aortic

and mitral murmur.  Abdomen is soft, nontender.  There is no peripheral edema.

 

Laboratory wise, hemoglobin 9.7, hematocrit 29, platelet count 164,000.  Basic

metabolic panel is unchanged, potassium is 3.6, BUN 24, creatinine 1.6, and

glucose 118.

 

ASSESSMENT/PLAN:

Mr. Willett is a rather complicated patient.  He is 84 years, has some form of

bone marrow disorder, most likely myelodysplastic syndrome causing significant

anemia and to a lesser degree neutropenia.  He also has chronic atrial

fibrillation and both aortic and mitral valve disease.  He had an echocardiogram

yesterday and the aortic insufficiency looks not worse than moderate, but the 
mitral

insufficiency is likely severe.  Also, his left ventricle ejection fraction is

mildly reduced.  I talked to the patient and his wife again today.  I think

they can go home and I would release the holding parameters for metoprolol so he

gets it for most days and hold it only for a heart rate less than 60.  I think

that we can cut down on the Lasix to just 40 mg a day.  The management of MDS as

per hematology.  Unfortunately, as far as the mitral insufficiency is concerned,

I do not believe that he is a good candidate for open heart surgery, but he 
might

be a candidate for mitral clip.  I will do some investigation on who might be

able to do the procedure and make a judgment if he is even a candidate.  I will

address this with him on an outpatient basis.  He has a scheduled followup in

approximately 3 weeks.

CHAPITO

## 2017-06-27 ENCOUNTER — HOSPITAL ENCOUNTER (OUTPATIENT)
Dept: HOSPITAL 53 - M LAB REF | Age: 82
End: 2017-06-27
Attending: INTERNAL MEDICINE
Payer: MEDICARE

## 2017-06-27 ENCOUNTER — HOSPITAL ENCOUNTER (OUTPATIENT)
Dept: HOSPITAL 53 - M OPP | Age: 82
End: 2017-06-27
Attending: NURSE PRACTITIONER
Payer: MEDICARE

## 2017-06-27 VITALS — HEIGHT: 72 IN | BODY MASS INDEX: 26.25 KG/M2 | WEIGHT: 193.79 LBS

## 2017-06-27 VITALS — DIASTOLIC BLOOD PRESSURE: 66 MMHG | SYSTOLIC BLOOD PRESSURE: 117 MMHG

## 2017-06-27 DIAGNOSIS — R05: ICD-10-CM

## 2017-06-27 DIAGNOSIS — D46.4: Primary | ICD-10-CM

## 2017-06-27 PROCEDURE — 86901 BLOOD TYPING SEROLOGIC RH(D): CPT

## 2017-06-27 PROCEDURE — 86850 RBC ANTIBODY SCREEN: CPT

## 2017-06-27 PROCEDURE — 82668 ASSAY OF ERYTHROPOIETIN: CPT

## 2017-06-27 PROCEDURE — 86920 COMPATIBILITY TEST SPIN: CPT

## 2017-06-27 PROCEDURE — 86900 BLOOD TYPING SEROLOGIC ABO: CPT

## 2017-06-27 RX ADMIN — ACETAMINOPHEN SCH MG: 325 TABLET ORAL at 07:01

## 2017-06-27 RX ADMIN — ACETAMINOPHEN SCH MG: 325 TABLET ORAL at 14:20

## 2017-09-20 ENCOUNTER — HOSPITAL ENCOUNTER (OUTPATIENT)
Dept: HOSPITAL 53 - M OPCLI4PV | Age: 82
LOS: 1 days | Discharge: HOME | End: 2017-09-21
Attending: INTERNAL MEDICINE
Payer: MEDICARE

## 2017-09-20 VITALS — SYSTOLIC BLOOD PRESSURE: 127 MMHG | DIASTOLIC BLOOD PRESSURE: 79 MMHG

## 2017-09-20 VITALS — DIASTOLIC BLOOD PRESSURE: 65 MMHG | SYSTOLIC BLOOD PRESSURE: 121 MMHG

## 2017-09-20 VITALS — DIASTOLIC BLOOD PRESSURE: 76 MMHG | SYSTOLIC BLOOD PRESSURE: 120 MMHG

## 2017-09-20 DIAGNOSIS — I35.9: ICD-10-CM

## 2017-09-20 DIAGNOSIS — Z85.828: ICD-10-CM

## 2017-09-20 DIAGNOSIS — Z87.891: ICD-10-CM

## 2017-09-20 DIAGNOSIS — Z79.899: ICD-10-CM

## 2017-09-20 DIAGNOSIS — N40.0: ICD-10-CM

## 2017-09-20 DIAGNOSIS — I34.8: ICD-10-CM

## 2017-09-20 DIAGNOSIS — E03.9: ICD-10-CM

## 2017-09-20 DIAGNOSIS — D64.9: Primary | ICD-10-CM

## 2017-09-20 DIAGNOSIS — I10: ICD-10-CM

## 2017-09-20 PROCEDURE — 86901 BLOOD TYPING SEROLOGIC RH(D): CPT

## 2017-09-20 PROCEDURE — 86850 RBC ANTIBODY SCREEN: CPT

## 2017-09-20 PROCEDURE — 36430 TRANSFUSION BLD/BLD COMPNT: CPT

## 2017-09-20 PROCEDURE — 86920 COMPATIBILITY TEST SPIN: CPT

## 2017-09-20 PROCEDURE — 86900 BLOOD TYPING SEROLOGIC ABO: CPT

## 2017-09-20 PROCEDURE — 36415 COLL VENOUS BLD VENIPUNCTURE: CPT

## 2017-09-21 VITALS — DIASTOLIC BLOOD PRESSURE: 76 MMHG | SYSTOLIC BLOOD PRESSURE: 129 MMHG

## 2017-09-28 ENCOUNTER — HOSPITAL ENCOUNTER (OUTPATIENT)
Dept: HOSPITAL 53 - M LAB REF | Age: 82
End: 2017-09-28
Attending: INTERNAL MEDICINE
Payer: MEDICARE

## 2017-09-28 DIAGNOSIS — D64.9: Primary | ICD-10-CM

## 2017-09-28 LAB
RETIC HEMOGLOBIN EQUIVALENT: 32.5 PG (ref 24–36)
RETICS/RBC NFR: 2.4 % (ref 0.5–1.5)

## 2017-10-23 ENCOUNTER — HOSPITAL ENCOUNTER (OUTPATIENT)
Dept: HOSPITAL 53 - M LAB REF | Age: 82
End: 2017-10-23
Attending: DERMATOLOGY
Payer: MEDICARE

## 2017-10-23 DIAGNOSIS — C44.319: Primary | ICD-10-CM

## 2017-10-23 PROCEDURE — 88305 TISSUE EXAM BY PATHOLOGIST: CPT

## 2017-10-23 PROCEDURE — 11100: CPT

## 2017-11-09 ENCOUNTER — HOSPITAL ENCOUNTER (OUTPATIENT)
Dept: HOSPITAL 53 - M LAB REF | Age: 82
End: 2017-11-09
Attending: INTERNAL MEDICINE
Payer: MEDICARE

## 2017-11-09 DIAGNOSIS — D46.9: Primary | ICD-10-CM

## 2017-11-10 ENCOUNTER — HOSPITAL ENCOUNTER (OUTPATIENT)
Dept: HOSPITAL 53 - M OPCLI5PR | Age: 82
Discharge: HOME | End: 2017-11-10
Attending: INTERNAL MEDICINE
Payer: MEDICARE

## 2017-11-10 VITALS — HEIGHT: 71 IN | BODY MASS INDEX: 28.06 KG/M2 | WEIGHT: 200.4 LBS

## 2017-11-10 DIAGNOSIS — Z79.899: ICD-10-CM

## 2017-11-10 DIAGNOSIS — D46.9: Primary | ICD-10-CM

## 2017-11-10 DIAGNOSIS — Z88.8: ICD-10-CM

## 2017-11-10 PROCEDURE — 36430 TRANSFUSION BLD/BLD COMPNT: CPT

## 2017-11-12 ENCOUNTER — HOSPITAL ENCOUNTER (OUTPATIENT)
Dept: HOSPITAL 53 - M ED | Age: 82
Setting detail: OBSERVATION
LOS: 1 days | Discharge: HOME | End: 2017-11-13
Attending: HOSPITALIST | Admitting: HOSPITALIST
Payer: MEDICARE

## 2017-11-12 VITALS — DIASTOLIC BLOOD PRESSURE: 78 MMHG | SYSTOLIC BLOOD PRESSURE: 124 MMHG

## 2017-11-12 VITALS — WEIGHT: 182.54 LBS | HEIGHT: 71 IN | BODY MASS INDEX: 25.56 KG/M2

## 2017-11-12 VITALS — SYSTOLIC BLOOD PRESSURE: 121 MMHG | DIASTOLIC BLOOD PRESSURE: 65 MMHG

## 2017-11-12 VITALS — DIASTOLIC BLOOD PRESSURE: 62 MMHG | SYSTOLIC BLOOD PRESSURE: 103 MMHG

## 2017-11-12 DIAGNOSIS — K64.8: ICD-10-CM

## 2017-11-12 DIAGNOSIS — E03.9: ICD-10-CM

## 2017-11-12 DIAGNOSIS — I48.91: ICD-10-CM

## 2017-11-12 DIAGNOSIS — I13.0: ICD-10-CM

## 2017-11-12 DIAGNOSIS — I08.0: ICD-10-CM

## 2017-11-12 DIAGNOSIS — K57.30: ICD-10-CM

## 2017-11-12 DIAGNOSIS — I50.22: ICD-10-CM

## 2017-11-12 DIAGNOSIS — Z85.828: ICD-10-CM

## 2017-11-12 DIAGNOSIS — N18.3: ICD-10-CM

## 2017-11-12 DIAGNOSIS — Z87.891: ICD-10-CM

## 2017-11-12 DIAGNOSIS — Z86.010: ICD-10-CM

## 2017-11-12 DIAGNOSIS — E78.5: ICD-10-CM

## 2017-11-12 DIAGNOSIS — D64.9: Primary | ICD-10-CM

## 2017-11-12 LAB
ANION GAP SERPL CALC-SCNC: 8 MEQ/L (ref 8–16)
BASOPHILS # BLD AUTO: 0 10^3/UL (ref 0–0.2)
BASOPHILS NFR BLD AUTO: 0.5 % (ref 0–1)
BUN SERPL-MCNC: 25 MG/DL (ref 7–18)
CALCIUM SERPL-MCNC: 9 MG/DL (ref 8.8–10.2)
CHLORIDE SERPL-SCNC: 106 MEQ/L (ref 98–107)
CO2 SERPL-SCNC: 27 MEQ/L (ref 21–32)
CREAT SERPL-MCNC: 1.54 MG/DL (ref 0.7–1.3)
EOSINOPHIL # BLD AUTO: 0 10^3/UL (ref 0–0.5)
EOSINOPHIL NFR BLD AUTO: 0.5 % (ref 0–3)
ERYTHROCYTE [DISTWIDTH] IN BLOOD BY AUTOMATED COUNT: 16.2 % (ref 11.5–14.5)
GFR SERPL CREATININE-BSD FRML MDRD: 46 ML/MIN/{1.73_M2} (ref 35–?)
GLUCOSE SERPL-MCNC: 100 MG/DL (ref 83–110)
IMM GRANULOCYTES NFR BLD: 1.1 % (ref 0–0)
INR PPP: 1.67
LYMPHOCYTES # BLD AUTO: 0.9 10^3/UL (ref 1.5–4.5)
LYMPHOCYTES NFR BLD AUTO: 19.6 % (ref 24–44)
MCH RBC QN AUTO: 30.7 PG (ref 27–33)
MCHC RBC AUTO-ENTMCNC: 31.3 G/DL (ref 32–36.5)
MCV RBC AUTO: 98.1 FL (ref 80–96)
MONOCYTES # BLD AUTO: 0.9 10^3/UL (ref 0–0.8)
MONOCYTES NFR BLD AUTO: 20.7 % (ref 0–5)
NEUTROPHILS # BLD AUTO: 2.6 10^3/UL (ref 1.8–7.7)
NEUTROPHILS NFR BLD AUTO: 57.6 % (ref 36–66)
NRBC BLD AUTO-RTO: 0 % (ref 0–0)
PLATELET # BLD AUTO: 118 10^3/UL (ref 150–450)
POTASSIUM SERPL-SCNC: 3.9 MEQ/L (ref 3.5–5.1)
SODIUM SERPL-SCNC: 141 MEQ/L (ref 136–145)
T4 FREE SERPL-MCNC: 1.28 NG/DL (ref 0.76–1.46)
WBC # BLD AUTO: 4.4 10^3/UL (ref 4–10)

## 2017-11-12 PROCEDURE — 93005 ELECTROCARDIOGRAM TRACING: CPT

## 2017-11-12 PROCEDURE — 84484 ASSAY OF TROPONIN QUANT: CPT

## 2017-11-12 PROCEDURE — 86901 BLOOD TYPING SEROLOGIC RH(D): CPT

## 2017-11-12 PROCEDURE — 80048 BASIC METABOLIC PNL TOTAL CA: CPT

## 2017-11-12 PROCEDURE — 82553 CREATINE MB FRACTION: CPT

## 2017-11-12 PROCEDURE — 82550 ASSAY OF CK (CPK): CPT

## 2017-11-12 PROCEDURE — 71010: CPT

## 2017-11-12 PROCEDURE — 36430 TRANSFUSION BLD/BLD COMPNT: CPT

## 2017-11-12 PROCEDURE — 84443 ASSAY THYROID STIM HORMONE: CPT

## 2017-11-12 PROCEDURE — 86900 BLOOD TYPING SEROLOGIC ABO: CPT

## 2017-11-12 PROCEDURE — 36415 COLL VENOUS BLD VENIPUNCTURE: CPT

## 2017-11-12 PROCEDURE — 94760 N-INVAS EAR/PLS OXIMETRY 1: CPT

## 2017-11-12 PROCEDURE — 85730 THROMBOPLASTIN TIME PARTIAL: CPT

## 2017-11-12 PROCEDURE — 93041 RHYTHM ECG TRACING: CPT

## 2017-11-12 PROCEDURE — 86920 COMPATIBILITY TEST SPIN: CPT

## 2017-11-12 PROCEDURE — 85610 PROTHROMBIN TIME: CPT

## 2017-11-12 PROCEDURE — 84439 ASSAY OF FREE THYROXINE: CPT

## 2017-11-12 PROCEDURE — 85027 COMPLETE CBC AUTOMATED: CPT

## 2017-11-12 PROCEDURE — 80053 COMPREHEN METABOLIC PANEL: CPT

## 2017-11-12 PROCEDURE — 86850 RBC ANTIBODY SCREEN: CPT

## 2017-11-12 PROCEDURE — 99285 EMERGENCY DEPT VISIT HI MDM: CPT

## 2017-11-12 PROCEDURE — 85025 COMPLETE CBC W/AUTO DIFF WBC: CPT

## 2017-11-12 RX ADMIN — DOCUSATE SODIUM SCH MG: 100 CAPSULE, LIQUID FILLED ORAL at 21:00

## 2017-11-12 RX ADMIN — DOCUSATE SODIUM SCH MG: 100 CAPSULE, LIQUID FILLED ORAL at 09:00

## 2017-11-12 NOTE — REP
Chest one-view

 

HISTORY: Shortness of breath

 

Comparison: None

 

The lungs are clear.  Cardiomegaly. The pulmonary vasculature is normal in

appearance.

 

Impression: Cardiomegaly.

 

 

Signed by

Fady Merino MD 11/12/2017 09:08 A

## 2017-11-12 NOTE — HPEPDOC
[facility] Medical History & Physical


Date of Admission


Nov 12, 2017


Primary Care Physician:  Jr Velez Collins


Other Provider


HEME/ONC: Dr. Lynch


CARDIOLOGY: Dr. Flores


DERMATOLOGY: Dr. Gonzalez


Attending Physician:  RAFAEL JOE DO





History and Physical


CHIEF COMPLAINT: Weakness





HISTORY OF PRESENT ILLNESS: 83 yo with known history of myelodysplastic 

syndrome that receives procrit injecgtions. Presents to ED with increased 

weakness, SOB/ZUNIGA and decreased endurance. He says he periodically needs 

transfusions for his "blood cancer".


Hospitalist was requested to admit patient for observation and transfusion 

since his H/H was lower than usual and he demonstrates symptoms related to his 

anemia.





Denies: constitutional symptoms, LOC or syncope, CP, productive cough, 

hemoptysis, n/v/d, f/c/r, hematemesis, hematochezia, melena or hematuria.





PAST MEDICAL HISTORY:


1.  Chronic anemia.  No history of blood loss.


2.  Atrial fibrillation.


3.  Hypertension.


4.  Hyperlipidemia.


5.  Hypothyroid.


6.  Basal cell cancer of the skin.


7.  Squamous cell cancer of the skin.


8.  Diverticulosis of the colon.


9.  Internal hemorrhoids.


10.  Tubular adenoma of the colon.


11.  Chronic kidney disease (CKD) stage III.


12.  Congestive heart failure, systolic with ejection fraction (EF) of 45-50%.


13.  Aortic regurgitation.


14.  Mitral regurgitation.


15.  Diastolic dysfunction.





PAST SURGICAL HISTORY:  Inguinal hernia repair.


 


ALLERGIES:  No known allergies.


 


SOCIAL HISTORY:  Patient was a smoker.  Quit many years ago.  Does not abuse


alcohol or recreational drugs.


Lives at home with his wife and questionable sick contacts recently.


He's up to date on flu and pneumonia vax.


 


FAMILY HISTORY:  Not pertinent.





REVIEW OF SYSTEMS:





CONSTITUTIONAL: Generalized weakness. ZUNIGA, SOB. No fever, chills, weight loss, 

nausea or vomiting  .


HEENT: No headache, lightheadedness, blurred or loss of vision. No difficulty 

with speech or swallow.


CARDIOVASCULAR: No chest pain, palpitations, paroxysmal nocturnal dyspnea or 

lower extremity edema


RESPIRATORY: ZUNIGA/SOB, decreased exertional tolerance and weakness. No cough, 

productive sputum, wheeze or hemoptysis


GENITOURINARY: No dysuria, frequency, or discharge


MUSCULOSKELETAL: No bone, muscle or joint pain.


GASTROINTESTINAL: No Nausea, vomiting, change in appetite. Bowel movements are 

regular without hematochezia or melena. No bladder or bowel incontinence.


SKIN: No complaint of lesions, abrasions or rashes


NEUROLOGICAL: No blurred vision, headaches, paraesthesias or paralysis


PSYCHIATRIC: No depression, anxiety, audiovisual hallucinations. No suicidal 

ideations.


ENDOCRINE: Denies history of diabetes or thyroid disorder. No history of 

endocrine abnormalities.


HEMATOLOGIC/ONCOLOGY: positive for MDS, no history of VTE 


LYMPHATIC: No lumps, bumps or swelling of neck, axilla or groin. No night 

sweats or weight loss.





Allergies: no known drug allergies





Home medications: See list below





PHYSICAL EXAMINATION:


VITAL SIGNS: Please see below. 


GENERAL: NAD, A&OX3, Pleasant 


HEENT: PERRLA, throat clear, neck supple, no JVD


CARDIOVASCULAR: RRR


RESPIRATORY: CTA Bilaterally


ABDOMINAL: soft, NT/ND normoactive bowel sounds


EXTREMITIES: no edema/no calf tenderness


NEUROLOGICAL: CN'S II-XII grossly intact


PSYCHOLOGICAL: negative





LABORATORY DATA: See below.





12 lead ecg: A fib without acute changes and rate controlled





IMAGING: 


CXR: : Cardiomegaly without acute infiltrate or consolidation.





MICROBIOLOGY: Please see below. 





IMPRESSION: 83 yo male with known history of MDS, on Procrit who unfortunately 

is having symptomatic anemia and requiring blood transfusion.





PROBLEM LIST: 


1.  Acute on Chronic anemia / Symptomatic anemia.  No history of blood loss.


2.  Atrial fibrillation.


3.  Hypertension.


4.  Hyperlipidemia.


5.  Hypothyroid.


6.  Basal cell cancer of the skin.


7.  Squamous cell cancer of the skin.


8.  Diverticulosis of the colon.


9.  Internal hemorrhoids.


10.  Tubular adenoma of the colon.


11.  Chronic kidney disease (CKD) stage III.


12.  Congestive heart failure, systolic with ejection fraction (EF) of 45-50%.


13.  Aortic regurgitation.


14.  Mitral regurgitation.


15.  Diastolic dysfunction.





PLAN: 


Admit to PCU on TM, consented for blood products. Transfuse 2uPRBCs and re-

chech h/h in morning.


Continue home medications.





DVT prophylaxis: he's chronically thrombocytopenic, but on Xarelto.





Advanced Directives: DNR/DNI





DISPOSITION: admit obs overnight, transfuse and likely discharge home tomorrow.





Vital Signs





Vital Signs








  Date Time  Temp Pulse Resp B/P (MAP) Pulse Ox O2 Delivery O2 Flow Rate FiO2


 


11/12/17 11:15 97.9 100 18 124/78 (93) 100 Room Air  











Laboratory Data


Labs 24H


Laboratory Tests 2


11/12/17 08:41: Bedside Glucose (Misc Panel) 98


11/12/17 08:56: 


Immature Granulocyte % (Auto) 1.1H, White Blood Count 4.4, Red Blood Count 2.70L

, Hemoglobin 8.3L, Hematocrit 26.5L, Mean Corpuscular Volume 98.1H, Mean 

Corpuscular Hemoglobin 30.7, Mean Corpuscular Hemoglobin Concent 31.3L, Red 

Cell Distribution Width 16.2H, Platelet Count 118L, Neutrophils (%) (Auto) 57.6

, Lymphocytes (%) (Auto) 19.6L, Monocytes (%) (Auto) 20.7H, Eosinophils (%) (

Auto) 0.5, Basophils (%) (Auto) 0.5, Neutrophils # (Auto) 2.6, Lymphocytes # (

Auto) 0.9L, Monocytes # (Auto) 0.9H, Eosinophils # (Auto) 0.0, Basophils # (Auto

) 0.0, Immature Granulocyte # (Auto) 0.1H, Nucleated Red Blood Cells % (auto) 

0.0, Prothrombin Time 20.1H, Prothromb Time International Ratio 1.67, Activated 

Partial Thromboplast Time 45.0H, Anion Gap 8, Glomerular Filtration Rate 46.0, 

Blood Urea Nitrogen 25H, Creatinine 1.54H, Sodium Level 141, Potassium Level 3.9

, Chloride Level 106, Carbon Dioxide Level 27, Calcium Level 9.0, Total 

Creatine Kinase 85, Creatine Kinase MB 2.7, Creatine Kinase MB Relative Index 

3.17, Troponin I 0.03, Thyroid Stimulating Hormone (TSH) 7.520H, Free Thyroxine 

1.28


CBC/BMP


Laboratory Tests


11/12/17 08:56








Red Blood Count 2.70 L, Mean Corpuscular Volume 98.1 H, Mean Corpuscular 

Hemoglobin 30.7, Mean Corpuscular Hemoglobin Concent 31.3 L, Red Cell 

Distribution Width 16.2 H, Neutrophils (%) (Auto) 57.6, Lymphocytes (%) (Auto) 

19.6 L, Monocytes (%) (Auto) 20.7 H, Eosinophils (%) (Auto) 0.5, Basophils (%) (

Auto) 0.5, Neutrophils # (Auto) 2.6, Lymphocytes # (Auto) 0.9 L, Monocytes # (

Auto) 0.9 H, Eosinophils # (Auto) 0.0, Basophils # (Auto) 0.0, Calcium Level 9.0

, Total Creatine Kinase 85





Home Medications


Scheduled


Atorvastatin Calcium (Atorvastatin Calcium) 10 Mg Tab, 10 MG PO QPM


Furosemide (Furosemide) 40 Mg Tab, 40 MG PO DAILY


Levothyroxine Sodium (Synthroid) 50 Mcg Tab, 25 MCG PO DAILY


Metoprolol Succinate (Toprol Xl) 25 Mg Tab, 25 MG PO DAILY


Multivitamins *[facility] STOCKED* (Thera M Plus *[facility] STOCKED*) 1 Tab Tab, 1 TAB PO 

DAILY


Rivaroxaban (Xarelto) 15 Mg Tab, 15 MG PO QPM





Scheduled PRN


 (Proair Respiclick) 108 Mcg/Act Aer, 2 PUFFS INH Q4H PRN for SHORTNESS OF 

BREATH





Allergies


Coded Allergies:  


     No Known Allergies (Unverified , 11/2/03)











RAFAEL JOE DO Nov 12, 2017 15:35

## 2017-11-13 VITALS — DIASTOLIC BLOOD PRESSURE: 84 MMHG | SYSTOLIC BLOOD PRESSURE: 125 MMHG

## 2017-11-13 VITALS — SYSTOLIC BLOOD PRESSURE: 119 MMHG | DIASTOLIC BLOOD PRESSURE: 76 MMHG

## 2017-11-13 VITALS — DIASTOLIC BLOOD PRESSURE: 65 MMHG | SYSTOLIC BLOOD PRESSURE: 139 MMHG

## 2017-11-13 LAB
ALBUMIN SERPL BCG-MCNC: 3.5 GM/DL (ref 3.2–5.2)
ALBUMIN/GLOB SERPL: 1.06 {RATIO} (ref 1–1.93)
ALP SERPL-CCNC: 87 U/L (ref 45–117)
ALT SERPL W P-5'-P-CCNC: 24 U/L (ref 12–78)
ANION GAP SERPL CALC-SCNC: 6 MEQ/L (ref 8–16)
AST SERPL-CCNC: 15 U/L (ref 7–37)
BILIRUB SERPL-MCNC: 1.3 MG/DL (ref 0.2–1)
BUN SERPL-MCNC: 27 MG/DL (ref 7–18)
CALCIUM SERPL-MCNC: 9 MG/DL (ref 8.8–10.2)
CHLORIDE SERPL-SCNC: 108 MEQ/L (ref 98–107)
CO2 SERPL-SCNC: 27 MEQ/L (ref 21–32)
CREAT SERPL-MCNC: 1.41 MG/DL (ref 0.7–1.3)
ERYTHROCYTE [DISTWIDTH] IN BLOOD BY AUTOMATED COUNT: 16.9 % (ref 11.5–14.5)
GFR SERPL CREATININE-BSD FRML MDRD: 51 ML/MIN/{1.73_M2} (ref 35–?)
GLUCOSE SERPL-MCNC: 113 MG/DL (ref 83–110)
MCH RBC QN AUTO: 30.8 PG (ref 27–33)
MCHC RBC AUTO-ENTMCNC: 32 G/DL (ref 32–36.5)
MCV RBC AUTO: 96.2 FL (ref 80–96)
NRBC BLD AUTO-RTO: 0 % (ref 0–0)
PLATELET # BLD AUTO: 112 10^3/UL (ref 150–450)
POTASSIUM SERPL-SCNC: 4.4 MEQ/L (ref 3.5–5.1)
PROT SERPL-MCNC: 6.8 GM/DL (ref 6.4–8.2)
SODIUM SERPL-SCNC: 141 MEQ/L (ref 136–145)
WBC # BLD AUTO: 4.8 10^3/UL (ref 4–10)

## 2017-11-13 RX ADMIN — DOCUSATE SODIUM SCH MG: 100 CAPSULE, LIQUID FILLED ORAL at 09:45

## 2017-11-13 NOTE — ECGEPIP
Stationary ECG Study

                           [facility] - ED

                                       

                                       Test Date:    2017

Pat Name:     KATYA TAI          Department:   

Patient ID:   [id number]                 Room:         [place] [address]

Gender:       M                        Technician:   cara

:          [id number]               Requested By: Ancelmo HERNANDEZ

Order Number: [id number]     Reading MD:   Ancelmo Arriola

                                 Measurements

Intervals                              Axis          

Rate:         97                       P:            

WI:           0                        QRS:          -19

QRSD:         113                      T:            137

QT:           380                                    

QTc:          484                                    

                           Interpretive Statements

ATRIAL FIBRILLATION WITH ABERRANT CONDUCTION OR VENTRICULAR PREMATURE

COMPLEXES

POSSIBLE LEFT VENTRICULAR HYPERTROPHY

NONSPECIFIC ST & T-WAVE ABNORMALITY

SIMILAR TO 17

 

Electronically Signed On 2017 5:43:08 EST by Ancelmo Arriola

## 2018-01-31 ENCOUNTER — HOSPITAL ENCOUNTER (OUTPATIENT)
Dept: HOSPITAL 53 - M SFHCLERA | Age: 83
End: 2018-01-31
Attending: DERMATOLOGY
Payer: MEDICARE

## 2018-01-31 DIAGNOSIS — L08.9: Primary | ICD-10-CM

## 2018-01-31 PROCEDURE — 87186 SC STD MICRODIL/AGAR DIL: CPT

## 2018-01-31 PROCEDURE — 87070 CULTURE OTHR SPECIMN AEROBIC: CPT

## 2018-02-27 ENCOUNTER — HOSPITAL ENCOUNTER (OUTPATIENT)
Dept: HOSPITAL 53 - M INFU | Age: 83
Discharge: HOME | End: 2018-02-27
Attending: NURSE PRACTITIONER
Payer: MEDICARE

## 2018-02-27 DIAGNOSIS — I34.0: ICD-10-CM

## 2018-02-27 DIAGNOSIS — D46.9: Primary | ICD-10-CM

## 2018-02-27 DIAGNOSIS — Z87.891: ICD-10-CM

## 2018-02-27 DIAGNOSIS — Z88.8: ICD-10-CM

## 2018-02-27 DIAGNOSIS — Z79.899: ICD-10-CM

## 2018-02-27 DIAGNOSIS — D64.9: ICD-10-CM

## 2018-02-27 LAB — IMMEDIATE SPIN CROSSMATCH: 1 1

## 2018-02-27 PROCEDURE — 36430 TRANSFUSION BLD/BLD COMPNT: CPT

## 2018-02-27 RX ADMIN — ACETAMINOPHEN 1 MG: 325 TABLET ORAL at 13:03

## 2018-03-06 ENCOUNTER — HOSPITAL ENCOUNTER (OUTPATIENT)
Dept: HOSPITAL 53 - M LAB REF | Age: 83
End: 2018-03-06
Attending: INTERNAL MEDICINE
Payer: MEDICARE

## 2018-03-06 DIAGNOSIS — D46.9: Primary | ICD-10-CM

## 2018-03-06 LAB
FERRITIN: 20 NG/ML (ref 26–388)
IRON (FE): 77 UG/DL (ref 65–175)
IRON SATN MFR SERPL: 23.3 % (ref 19.7–50)
TOTAL IRON BINDING CAPACITY: 331 UG/DL (ref 250–450)

## 2018-03-06 PROCEDURE — 83550 IRON BINDING TEST: CPT

## 2018-03-12 ENCOUNTER — HOSPITAL ENCOUNTER (OUTPATIENT)
Dept: HOSPITAL 53 - M SFHCLERA | Age: 83
End: 2018-03-12
Attending: DERMATOLOGY
Payer: MEDICARE

## 2018-03-12 DIAGNOSIS — L08.9: Primary | ICD-10-CM

## 2018-03-12 PROCEDURE — 87070 CULTURE OTHR SPECIMN AEROBIC: CPT

## 2018-03-12 PROCEDURE — 87077 CULTURE AEROBIC IDENTIFY: CPT

## 2018-03-12 PROCEDURE — 87186 SC STD MICRODIL/AGAR DIL: CPT

## 2018-04-03 ENCOUNTER — HOSPITAL ENCOUNTER (OUTPATIENT)
Dept: HOSPITAL 53 - M LAB REF | Age: 83
End: 2018-04-03
Attending: INTERNAL MEDICINE
Payer: MEDICARE

## 2018-04-03 DIAGNOSIS — D46.0: Primary | ICD-10-CM

## 2018-04-03 LAB
IRON (FE): 69 UG/DL (ref 65–175)
IRON SATN MFR SERPL: 23.5 % (ref 19.7–50)
TOTAL IRON BINDING CAPACITY: 294 UG/DL (ref 250–450)

## 2018-04-03 PROCEDURE — 83550 IRON BINDING TEST: CPT

## 2018-10-01 VITALS — SYSTOLIC BLOOD PRESSURE: 141 MMHG | DIASTOLIC BLOOD PRESSURE: 84 MMHG

## 2018-10-01 LAB
HCT VFR BLD AUTO: 32.8 % (ref 37–51)
HGB BLD-MCNC: 10.3 G/DL (ref 12–18)
LYMPHOCYTES NFR BLD AUTO: 36 % (ref 10–58.5)
MCH RBC QN AUTO: 29.9 PG (ref 26–32)
MCHC RBC AUTO-ENTMCNC: 31.4 G/DL (ref 31–36)
MCV RBC AUTO: 95.4 FL (ref 80–97)
NEUTROPHILS # BLD AUTO: 1.8 10^3/UL (ref 2–7.8)
NEUTROPHILS NFR BLD AUTO: 47 % (ref 37–92)
PLATELET # BLD AUTO: 151 10^3/UL (ref 140–440)
RBC # BLD AUTO: 3.44 10^6/UL (ref 4.2–6.3)
WBC # BLD AUTO: 3.9 10^3/UL (ref 4.1–10.9)

## 2018-10-08 VITALS — DIASTOLIC BLOOD PRESSURE: 82 MMHG | SYSTOLIC BLOOD PRESSURE: 164 MMHG

## 2018-10-08 LAB
HCT VFR BLD AUTO: 30.6 % (ref 37–51)
HGB BLD-MCNC: 9.8 G/DL (ref 12–18)
LYMPHOCYTES NFR BLD AUTO: 34.9 % (ref 10–58.5)
MCH RBC QN AUTO: 30.5 PG (ref 26–32)
MCHC RBC AUTO-ENTMCNC: 32 G/DL (ref 31–36)
MCV RBC AUTO: 95.3 FL (ref 80–97)
NEUTROPHILS # BLD AUTO: 1.5 10^3/UL (ref 2–7.8)
NEUTROPHILS NFR BLD AUTO: 42.4 % (ref 37–92)
PLATELET # BLD AUTO: 143 10^3/UL (ref 140–440)
RBC # BLD AUTO: 3.21 10^6/UL (ref 4.2–6.3)
WBC # BLD AUTO: 3.5 10^3/UL (ref 4.1–10.9)

## 2018-10-15 LAB
HCT VFR BLD AUTO: 31 % (ref 37–51)
HGB BLD-MCNC: 9.8 G/DL (ref 12–18)
LYMPHOCYTES NFR BLD AUTO: 37.4 % (ref 10–58.5)
MCH RBC QN AUTO: 30.2 PG (ref 26–32)
MCHC RBC AUTO-ENTMCNC: 31.6 G/DL (ref 31–36)
MCV RBC AUTO: 95.6 FL (ref 80–97)
NEUTROPHILS # BLD AUTO: 1.3 10^3/UL (ref 2–7.8)
NEUTROPHILS NFR BLD AUTO: 39.4 % (ref 37–92)
PLATELET # BLD AUTO: 136 10^3/UL (ref 140–440)
RBC # BLD AUTO: 3.24 10^6/UL (ref 4.2–6.3)
WBC # BLD AUTO: 3.3 10^3/UL (ref 4.1–10.9)

## 2018-10-22 VITALS — SYSTOLIC BLOOD PRESSURE: 115 MMHG | DIASTOLIC BLOOD PRESSURE: 55 MMHG

## 2018-10-22 LAB
HCT VFR BLD AUTO: 30.9 % (ref 37–51)
HGB BLD-MCNC: 9.8 G/DL (ref 12–18)
LYMPHOCYTES NFR BLD AUTO: 38.9 % (ref 10–58.5)
MCH RBC QN AUTO: 30.2 PG (ref 26–32)
MCHC RBC AUTO-ENTMCNC: 31.7 G/DL (ref 31–36)
MCV RBC AUTO: 95.5 FL (ref 80–97)
NEUTROPHILS # BLD AUTO: 1.2 10^3/UL (ref 2–7.8)
NEUTROPHILS NFR BLD AUTO: 37 % (ref 37–92)
PLATELET # BLD AUTO: 128 10^3/UL (ref 140–440)
RBC # BLD AUTO: 3.24 10^6/UL (ref 4.2–6.3)
WBC # BLD AUTO: 3.3 10^3/UL (ref 4.1–10.9)

## 2018-10-23 NOTE — MEDONC
MEDICAL ONCOLOGY/HEMATOLOGY FOLLOWUP/TREATMENT VISIT

 

DATE OF SERVICE:  10/22/2018

 

Kev is seen today by BERNARD Cruz, with Dr. Chuyita Lynch, the supervising

physician.

 

DIAGNOSIS:

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

sideroblast, atypical JAK2 positive with normal cellular marrow, absence of

myelofibrosis, thrombocytosis, or leukocytosis, normal cytogenetics on bone

marrow biopsy 2016 and 2017, managed with erythropoietin 60,000 units

subcutaneous p.r.n. as needed.

 

CURRENT THERAPY:

Erythropoietin margarito 60,000 units weekly, hold for hemoglobin 12 or above.  As

needed RBC transfusions (1 unit).  Most recent transfusion 02/27/2018.

 

INTERVAL HISTORY:

Kev returns today for a followup, blood work, and possible Procrit.  He reports

no interval events since we saw him last.  He specifically denies complaints of

chest pain, dyspnea, palpitations, extremity edema, recurrent infections, fevers,

chills, or ease of bruising.  ECOG performance status is zero to one, limited

only by advanced age.

 

Vital signs reviewed today and are stable with a weight of 87.3 kg, temperature

97.7, pulse 73, respirations 18, /55, O2 sat 100% at rest on room air.

 

LABORATORY DATA:

CBC today:  WBC 3.3, ANC 1.2 (chronic), RBC 3.24, H and H 9.8 and 30.9, platelets

128.

 

IMPRESSION:

Kev is a miguel 85-year-old white male with IPSS-R2, low risk myelodysplastic

syndrome, refractory anemia without ring sideroblast, atypical JAK2 positive with

normal cellular marrow, absence of myelofibrosis, normal cytogenetics, absence of

thrombocytosis on bone marrow biopsy 2016 and 2017, managed with erythropoietin

60,000 units weekly and as-needed red blood cell transfusions.

 

PLAN:

 

1.  Erythropoietin 60,000 units subcu today.

 

2.  Weekly CBCs and possible Procrit for hemoglobin less than 12.

 

We will tentatively see Kev for brief office visit when he is here in 4 weeks or

sooner if any problems.

 

 

 

Reviewed by

Arminda Christie NP 10/24/2018 07:06 A

 

 

Electronically Signed by

Chuyita Lynch MD 10/24/2018 07:17 P

 

 

 

 

DD:  Arminda Christie NP 10/22/2018 12:29 P

DT:  aml 10/23/2018 10:20 A

 

CC:   Arsenio Velez Jr, MD Vojtech Slezka, MD

## 2018-10-29 VITALS — SYSTOLIC BLOOD PRESSURE: 108 MMHG | DIASTOLIC BLOOD PRESSURE: 62 MMHG

## 2018-10-29 LAB
HCT VFR BLD AUTO: 29.1 % (ref 37–51)
HGB BLD-MCNC: 9.5 G/DL (ref 12–18)
LYMPHOCYTES NFR BLD AUTO: 44.5 % (ref 10–58.5)
MCH RBC QN AUTO: 31.3 PG (ref 26–32)
MCHC RBC AUTO-ENTMCNC: 32.6 G/DL (ref 31–36)
MCV RBC AUTO: 95.8 FL (ref 80–97)
NEUTROPHILS # BLD AUTO: 1.1 10^3/UL (ref 2–7.8)
NEUTROPHILS NFR BLD AUTO: 34.4 % (ref 37–92)
PLATELET # BLD AUTO: 132 10^3/UL (ref 140–440)
RBC # BLD AUTO: 3.04 10^6/UL (ref 4.2–6.3)
WBC # BLD AUTO: 3.1 10^3/UL (ref 4.1–10.9)

## 2018-11-05 VITALS — DIASTOLIC BLOOD PRESSURE: 75 MMHG | SYSTOLIC BLOOD PRESSURE: 124 MMHG

## 2018-11-05 LAB
HCT VFR BLD AUTO: 29.5 % (ref 37–51)
HGB BLD-MCNC: 9.4 G/DL (ref 12–18)
LYMPHOCYTES NFR BLD AUTO: 40.2 % (ref 10–58.5)
MCH RBC QN AUTO: 30.6 PG (ref 26–32)
MCHC RBC AUTO-ENTMCNC: 31.9 G/DL (ref 31–36)
MCV RBC AUTO: 96.1 FL (ref 80–97)
NEUTROPHILS # BLD AUTO: 1.1 10^3/UL (ref 2–7.8)
NEUTROPHILS NFR BLD AUTO: 35 % (ref 37–92)
PLATELET # BLD AUTO: 120 10^3/UL (ref 140–440)
RBC # BLD AUTO: 3.07 10^6/UL (ref 4.2–6.3)
WBC # BLD AUTO: 3 10^3/UL (ref 4.1–10.9)

## 2018-11-12 VITALS — SYSTOLIC BLOOD PRESSURE: 124 MMHG | DIASTOLIC BLOOD PRESSURE: 77 MMHG

## 2018-11-12 LAB
HCT VFR BLD AUTO: 30.3 % (ref 37–51)
HGB BLD-MCNC: 9.5 G/DL (ref 12–18)
LYMPHOCYTES NFR BLD AUTO: 33.6 % (ref 10–58.5)
MCH RBC QN AUTO: 30.5 PG (ref 26–32)
MCHC RBC AUTO-ENTMCNC: 31.4 G/DL (ref 31–36)
MCV RBC AUTO: 97.3 FL (ref 80–97)
NEUTROPHILS # BLD AUTO: 1.6 10^3/UL (ref 2–7.8)
NEUTROPHILS NFR BLD AUTO: 42.7 % (ref 37–92)
PLATELET # BLD AUTO: 131 10^3/UL (ref 140–440)
RBC # BLD AUTO: 3.11 10^6/UL (ref 4.2–6.3)
WBC # BLD AUTO: 3.8 10^3/UL (ref 4.1–10.9)

## 2018-11-19 VITALS — DIASTOLIC BLOOD PRESSURE: 75 MMHG | SYSTOLIC BLOOD PRESSURE: 112 MMHG

## 2018-11-19 LAB
HCT VFR BLD AUTO: 29.3 % (ref 37–51)
HGB BLD-MCNC: 9.4 G/DL (ref 12–18)
LYMPHOCYTES NFR BLD AUTO: 39.9 % (ref 10–58.5)
MCH RBC QN AUTO: 30.8 PG (ref 26–32)
MCHC RBC AUTO-ENTMCNC: 32.1 G/DL (ref 31–36)
MCV RBC AUTO: 96.1 FL (ref 80–97)
NEUTROPHILS # BLD AUTO: 1.1 10^3/UL (ref 2–7.8)
NEUTROPHILS NFR BLD AUTO: 37.2 % (ref 37–92)
PLATELET # BLD AUTO: 125 10^3/UL (ref 140–440)
RBC # BLD AUTO: 3.05 10^6/UL (ref 4.2–6.3)
WBC # BLD AUTO: 3 10^3/UL (ref 4.1–10.9)

## 2018-11-20 NOTE — MEDONC
MEDICAL ONCOLOGY / HEMATOLOGY FOLLOWUP / TREATMENT VISIT

 

DATE OF SERVICE:  11/19/2018

 

Kev is seen today by BERNARD Cruz with Dr. Mila Landis, the covering

physician for Dr. Chuyita Lynch

 

DIAGNOSIS:

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

sideroblasts, atypical JAK2 positive with normal cellular marrow, absence of

myelofibrosis, thrombocytosis, or leukocytosis, normal cytogenetics on bone

marrow biopsy 2016 and 2017.  Managed with the erythropoietin 60,000 units subcu

p.r.n. as needed, most recently weekly.

 

CURRENT THERAPY:  Erythropoietin margarito 60,000 units weekly, hold for hemoglobin 12

or above.  Most recent RBC transfusion 02/27/2018.

 

INTERVAL HISTORY

Kev is seen today for a rapid followup visit.  He reports no major interval

events since seen last.  He specifically denies complaints of chest pain,

dyspnea, palpitations, extremity edema, recurrent infections, fevers, chills or

ease of bruising.  ECOG performance status is 0 to 1, limited only by age.

 

LABORATORY DATA:

CBC today indicates a white blood cell count of 3.0, ANC 1.1 (stable), RBC 3.05,

H and H 9.4 and 29.3 platelets 125.

 

BRIEF PHYSICAL EXAMINATION:

Weight is 87 kg, temperature 97.6, pulse 75, respirations 18, /75, O2 sat

97% at rest on room air.

GENERAL EXAM:  Reveals a miguel older gentleman, well-groomed who is comfortable

and in no acute distress.  Cardiac is S1-S2.  Lung sounds are clear to

auscultation.  Extremities without clubbing, cyanosis or edema.

 

ASSESSMENT:

Kev is a miguel 85-year-old white male with IPSS - R-2, low risk myelodysplastic

syndrome, refractory anemia without ring sideroblasts, atypical JAK2 positive

with normal cellular marrow, absence of myelofibrosis, normal cytogenetics,

absence of thrombocytosis on bone marrow biopsy 2016 and 2017, managed with

erythropoietin 60,000 units weekly and as needed red blood cell transfusions.

 

PLAN:

1.  Erythropoietin 60,000 units subcu today.

2.  Return weekly for CBCs and possible Procrit for hemoglobin less than 12.  We

will tentatively plan to see Kev for brief office visit when he is here in 4

weeks or certainly sooner if any new symptoms, questions or problems.

Reviewed by

Arminda Christie NP 11/27/2018 07:13 A

 

 

Electronically Signed by

Mila Landis MD, FACP 11/27/2018 08:34 A

 

 

 

 

DD:  Arminda Christie NP 11/19/2018 10:48 A

DT:  savana 11/20/2018 09:33 A

 

CC:   Arsenio Velez Jr, MD Vojtech Slezka, MD

## 2018-11-27 VITALS — DIASTOLIC BLOOD PRESSURE: 73 MMHG | SYSTOLIC BLOOD PRESSURE: 132 MMHG

## 2018-11-27 LAB
HCT VFR BLD AUTO: 30.8 % (ref 37–51)
HGB BLD-MCNC: 10 G/DL (ref 12–18)
LYMPHOCYTES NFR BLD AUTO: 38.6 % (ref 10–58.5)
MCH RBC QN AUTO: 31.3 PG (ref 26–32)
MCHC RBC AUTO-ENTMCNC: 32.5 G/DL (ref 31–36)
MCV RBC AUTO: 96.4 FL (ref 80–97)
NEUTROPHILS # BLD AUTO: 1.3 10^3/UL (ref 2–7.8)
NEUTROPHILS NFR BLD AUTO: 40.9 % (ref 37–92)
PLATELET # BLD AUTO: 148 10^3/UL (ref 140–440)
RBC # BLD AUTO: 3.19 10^6/UL (ref 4.2–6.3)
WBC # BLD AUTO: 3.3 10^3/UL (ref 4.1–10.9)

## 2018-12-03 LAB
HCT VFR BLD AUTO: 29.6 % (ref 37–51)
HGB BLD-MCNC: 9.5 G/DL (ref 12–18)
LYMPHOCYTES NFR BLD AUTO: 34.8 % (ref 10–58.5)
MCH RBC QN AUTO: 30.6 PG (ref 26–32)
MCHC RBC AUTO-ENTMCNC: 32.1 G/DL (ref 31–36)
MCV RBC AUTO: 95.6 FL (ref 80–97)
NEUTROPHILS # BLD AUTO: 1.5 10^3/UL (ref 2–7.8)
NEUTROPHILS NFR BLD AUTO: 42.8 % (ref 37–92)
PLATELET # BLD AUTO: 124 10^3/UL (ref 140–440)
RBC # BLD AUTO: 3.1 10^6/UL (ref 4.2–6.3)
WBC # BLD AUTO: 3.4 10^3/UL (ref 4.1–10.9)

## 2018-12-10 LAB
ALBUMIN SERPL BCG-MCNC: 4 GM/DL (ref 3.5–5.2)
BUN SERPL-MCNC: 20 MG/DL (ref 6–20)
CALCIUM SERPL-MCNC: 9.5 MG/DL (ref 8.5–10.2)
CHLORIDE SERPL-SCNC: 103 MMOL/L (ref 98–107)
CO2 SERPL-SCNC: 28 MEQ/L (ref 23–31)
CREAT SERPL-MCNC: 1.75 MG/DL (ref 0.9–1.3)
GFR SERPL CREATININE-BSD FRML MDRD: 39.6 ML/MIN/{1.73_M2} (ref 35–?)
GLUCOSE SERPL-MCNC: 112 MG/DL (ref 70–105)
HCT VFR BLD AUTO: 27.3 % (ref 37–51)
HGB BLD-MCNC: 8.6 G/DL (ref 12–18)
LYMPHOCYTES NFR BLD AUTO: 40.9 % (ref 10–58.5)
MCH RBC QN AUTO: 30.2 PG (ref 26–32)
MCHC RBC AUTO-ENTMCNC: 31.5 G/DL (ref 31–36)
MCV RBC AUTO: 95.7 FL (ref 80–97)
NEUTROPHILS # BLD AUTO: 1.1 10^3/UL (ref 2–7.8)
NEUTROPHILS NFR BLD AUTO: 35.7 % (ref 37–92)
PLATELET # BLD AUTO: 115 10^3/UL (ref 140–440)
POTASSIUM SERPL-SCNC: 4.6 MMOL/L (ref 3.5–5.1)
PROT SERPL-MCNC: 6.6 GM/DL (ref 6.4–8.3)
RBC # BLD AUTO: 2.85 10^6/UL (ref 4.2–6.3)
SODIUM SERPL-SCNC: 138 MMOL/L (ref 136–145)
WBC # BLD AUTO: 3.2 10^3/UL (ref 4.1–10.9)

## 2018-12-11 ENCOUNTER — HOSPITAL ENCOUNTER (OUTPATIENT)
Dept: HOSPITAL 53 - M INFU | Age: 83
Discharge: HOME | End: 2018-12-11
Attending: NURSE PRACTITIONER
Payer: MEDICARE

## 2018-12-11 DIAGNOSIS — D64.9: Primary | ICD-10-CM

## 2018-12-11 DIAGNOSIS — D46.9: ICD-10-CM

## 2018-12-11 PROCEDURE — 36430 TRANSFUSION BLD/BLD COMPNT: CPT

## 2018-12-11 RX ADMIN — ACETAMINOPHEN 1 MG: 325 TABLET ORAL at 13:33

## 2018-12-17 VITALS — SYSTOLIC BLOOD PRESSURE: 125 MMHG | DIASTOLIC BLOOD PRESSURE: 75 MMHG

## 2018-12-17 LAB
HCT VFR BLD AUTO: 30.1 % (ref 37–51)
HGB BLD-MCNC: 9.8 G/DL (ref 12–18)
LYMPHOCYTES NFR BLD AUTO: 29.6 % (ref 10–58.5)
MCH RBC QN AUTO: 31.4 PG (ref 26–32)
MCHC RBC AUTO-ENTMCNC: 32.6 G/DL (ref 31–36)
MCV RBC AUTO: 96.6 FL (ref 80–97)
NEUTROPHILS # BLD AUTO: 1.7 10^3/UL (ref 2–7.8)
NEUTROPHILS NFR BLD AUTO: 45.7 % (ref 37–92)
PLATELET # BLD AUTO: 124 10^3/UL (ref 140–440)
RBC # BLD AUTO: 3.12 10^6/UL (ref 4.2–6.3)
WBC # BLD AUTO: 3.7 10^3/UL (ref 4.1–10.9)

## 2018-12-17 NOTE — MEDONC
MEDICAL ONCOLOGY/HEMATOLOGY FOLLOWUP/TREATMENT VISIT

 

DATE OF SERVICE:  12/17/2018

 

Kev is seen today by BERNARD Cruz with Dr. Chuyita Lynch the supervising

physician.

 

DIAGNOSIS:

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

sideroblasts, atypical JAK2 positive with normal cellular marrow, absence of

myelofibrosis, thrombocytosis, or leukocytosis, normal cytogenetics on bone

marrow biopsy 2016 and 2017.  Managed with erythropoietin 60,000 units weekly

p.r.n. for a hemoglobin less than 12.  Also managed with an as needed transfusion

for symptomatic anemia and a hemoglobin in the 8 range.

 

CURRENT THERAPY:

Erythropoietin margarito 60,000 units weekly, hold for hemoglobin 12 or above.  Most

recent RBC transfusion 12/10/2018 for a hemoglobin of 8.6, hematocrit 27.3.

 

INTERVAL HISTORY:

Kev is seen today for a rapid followup visit.  He reports less fatigue and more

energy after his transfusion of 1 unit last week.  Tolerated the transfusion

well.  Specifically denies complaints today of chest pain, dyspnea, palpitations,

extremity edema, recurrent infection, fevers, chills or ease of bruising.  ECOG

performance status 0-1, limited only by advanced age.

 

LABORATORY DATA:

WBC 3.7, ANC 1.7, RBC 3.12, hemoglobin and hematocrit 9.8, 30.1, platelets 124.

 

ASSESSMENT:

Kev is a miguel, 85-year-old male with IPSS-R-2, low risk myelodysplastic

syndrome, refractory anemia without ring sideroblasts, atypical JAK2 positive

with normal cellular marrow, absence of myelofibrosis, normal cytogenetics,

absence of thrombocytosis on bone marrow biopsy 2016 and 2017, managed with the

erythropoietin 60,000 units weekly and as needed red blood cell transfusion.

 

PLAN:

1.  Erythropoietin 60,000 units subcu today.

2.  Return to clinic weekly times four for CBC type and cross and for possible

Procrit for hemoglobin less than 12.

3.  Kev will be seen for brief office visit when he is here in 4 weeks or

certainly sooner if any problems.

 

 

 

 

Reviewed by

Arminda Christie NP 12/17/2018 03:54 P

 

 

Electronically Signed by

Chuyita Lynch MD 12/18/2018 03:58 P

 

 

 

 

DD:  Arminda Christie NP 12/17/2018 10:44 A

DT:  moe 12/17/2018 03:37 P

 

CC:   Arsenio Velez Jr, MD Vojtech Slezka MD

## 2018-12-24 LAB
HCT VFR BLD AUTO: 29.4 % (ref 42–52)
HGB BLD-MCNC: 9.4 G/DL (ref 13.5–17.5)
LYMPHOCYTES NFR BLD AUTO: 29.7 % (ref 24–44)
MCH RBC QN AUTO: 30.8 PG (ref 27–33)
MCHC RBC AUTO-ENTMCNC: 32 G/DL (ref 32–36.5)
MCV RBC AUTO: 96.3 FL (ref 80–96)
NEUTROPHILS # BLD AUTO: 1.5 10^3/UL (ref 1.8–7.7)
NEUTROPHILS NFR BLD AUTO: 45.5 % (ref 36–66)
PLATELET # BLD AUTO: 98 10^3/UL (ref 150–450)
RBC # BLD AUTO: 3.05 10^6/UL (ref 4.3–6.1)
WBC # BLD AUTO: 3.4 10^3/UL (ref 4–10)

## 2018-12-31 LAB
HCT VFR BLD AUTO: 30.1 % (ref 42–52)
HGB BLD-MCNC: 9.7 G/DL (ref 13.5–17.5)
LYMPHOCYTES NFR BLD AUTO: 36.8 % (ref 24–44)
MCH RBC QN AUTO: 31.1 PG (ref 27–33)
MCHC RBC AUTO-ENTMCNC: 32.2 G/DL (ref 32–36.5)
MCV RBC AUTO: 96.5 FL (ref 80–96)
NEUTROPHILS # BLD AUTO: 1.3 10^3/UL (ref 1.8–7.7)
NEUTROPHILS NFR BLD AUTO: 41.2 % (ref 36–66)
PLATELET # BLD AUTO: 130 10^3/UL (ref 150–450)
RBC # BLD AUTO: 3.12 10^6/UL (ref 4.3–6.1)
WBC # BLD AUTO: 3.2 10^3/UL (ref 4–10)

## 2019-01-07 LAB
HCT VFR BLD AUTO: 30.4 % (ref 42–52)
HGB BLD-MCNC: 9.8 G/DL (ref 13.5–17.5)
LYMPHOCYTES NFR BLD AUTO: 37.6 % (ref 24–44)
MCH RBC QN AUTO: 31.2 PG (ref 27–33)
MCHC RBC AUTO-ENTMCNC: 32.2 G/DL (ref 32–36.5)
MCV RBC AUTO: 96.9 FL (ref 80–96)
NEUTROPHILS # BLD AUTO: 1.4 10^3/UL (ref 1.8–7.7)
NEUTROPHILS NFR BLD AUTO: 38.3 % (ref 36–66)
PLATELET # BLD AUTO: 137 10^3/UL (ref 150–450)
RBC # BLD AUTO: 3.14 10^6/UL (ref 4.3–6.1)
WBC # BLD AUTO: 3.7 10^3/UL (ref 4–10)

## 2019-01-16 VITALS — DIASTOLIC BLOOD PRESSURE: 76 MMHG | SYSTOLIC BLOOD PRESSURE: 118 MMHG

## 2019-01-16 LAB
HCT VFR BLD AUTO: 30.8 % (ref 42–52)
HGB BLD-MCNC: 9.5 G/DL (ref 13.5–17.5)
LYMPHOCYTES NFR BLD AUTO: 39.2 % (ref 24–44)
MCH RBC QN AUTO: 30.5 PG (ref 27–33)
MCHC RBC AUTO-ENTMCNC: 30.8 G/DL (ref 32–36.5)
MCV RBC AUTO: 99.1 FL (ref 80–96)
NEUTROPHILS # BLD AUTO: 1.5 10^3/UL (ref 1.8–7.7)
NEUTROPHILS NFR BLD AUTO: 39.9 % (ref 36–66)
PLATELET # BLD AUTO: 132 10^3/UL (ref 150–450)
RBC # BLD AUTO: 3.11 10^6/UL (ref 4.3–6.1)
WBC # BLD AUTO: 3.7 10^3/UL (ref 4–10)

## 2019-01-17 NOTE — MEDONC
MEDICAL ONCOLOGY HEMATOLOGY FOLLOWUP/TREATMENT VISIT:

 

DATE OF SERVICE:  DATE OF SERVICE:

 

Kev is seen today by BERNARD Cruz with Dr. Chuyita Lynch the supervising

physician.

 

DIAGNOSIS:

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

sideroblasts, atypical JAK2 positive with normal cellular marrow, absence of

myelofibrosis, thrombocytosis or leukocytosis, normal cytogenetics on bone marrow

biopsy 2016 and 2017.  Managed with the erythropoietin 60,000 units weekly p.r.n.

for hemoglobin less than 12.  Also managed with as needed transfusion for

symptomatic anemia and a hemoglobin in the 8 range.

 

CURRENT THERAPY:

Erythropoietin margarito 60,000 units weekly, hold for hemoglobin 12 or above.  Most

recent RBC transfusion 12/10/2018 (1 unit) for a hemoglobin of 8.6, hematocrit

27.3.

 

INTERVAL HISTORY:

Kev is seen today for brief followup visit.  With exception of mild fatigue, he

feels well.  He specifically denies complaints of chest pain, dyspnea,

palpitations, extremity edema recurrent infections, fevers, chills, spontaneous

bleeding or ease of bruising.  ECOG performance status 0 to 1, limited only by

advanced age.

 

LABORATORY DATA:

WBC 3.7, ANC 1.5, RBC 3.11, hemoglobin and hematocrit 9.5, 30.8 platelets 132.

 

IMPRESSION:

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

sideroblasts, atypical JAK2 positive with normal cellular marrow, absence of

myelofibrosis, normal cytogenetics, absence of thrombocytosis on bone marrow

biopsy 2016 and 2017, managed with erythropoietin 60,000 units weekly and p.r.n.

red blood cell transfusion.

 

PLAN:

1.  Erythropoietin 60,000 units subcu today.

2.  Return to clinic weekly times four with a CBC, pink tube for type and cross

and for possible Procrit for hemoglobin less than 12.

3.  Kev will be seen for brief office visit when he is here in 4 weeks or

certainly sooner if any new symptoms, questions or problems.

 

 

 

Reviewed by

Arminda Christie NP 01/17/2019 02:23 P

 

 

Electronically Signed by

Chuyita Lynch MD 01/17/2019 06:34 P

 

 

 

 

DD:  Arminda Christie NP 01/16/2019 09:03 A

DT:  anshul 01/17/2019 11:02 A

 

CC:   Arsenio Velez Jr, MD Vojtech Slezka, MD

## 2019-01-22 VITALS — SYSTOLIC BLOOD PRESSURE: 122 MMHG | DIASTOLIC BLOOD PRESSURE: 75 MMHG

## 2019-01-22 LAB
HCT VFR BLD AUTO: 30.3 % (ref 42–52)
HGB BLD-MCNC: 9.5 G/DL (ref 13.5–17.5)
LYMPHOCYTES NFR BLD AUTO: 37.2 % (ref 24–44)
MCH RBC QN AUTO: 30.6 PG (ref 27–33)
MCHC RBC AUTO-ENTMCNC: 31.4 G/DL (ref 32–36.5)
MCV RBC AUTO: 97.8 FL (ref 80–96)
NEUTROPHILS # BLD AUTO: 1.6 10^3/UL (ref 1.8–7.7)
NEUTROPHILS NFR BLD AUTO: 41.8 % (ref 36–66)
PLATELET # BLD AUTO: 130 10^3/UL (ref 150–450)
RBC # BLD AUTO: 3.1 10^6/UL (ref 4.3–6.1)
WBC # BLD AUTO: 3.9 10^3/UL (ref 4–10)

## 2019-01-28 VITALS — DIASTOLIC BLOOD PRESSURE: 72 MMHG | SYSTOLIC BLOOD PRESSURE: 121 MMHG

## 2019-01-28 LAB
HCT VFR BLD AUTO: 28.7 % (ref 42–52)
HGB BLD-MCNC: 9.3 G/DL (ref 13.5–17.5)
LYMPHOCYTES NFR BLD AUTO: 28.9 % (ref 24–44)
MCH RBC QN AUTO: 31.7 PG (ref 27–33)
MCHC RBC AUTO-ENTMCNC: 32.4 G/DL (ref 32–36.5)
MCV RBC AUTO: 97.8 FL (ref 80–96)
NEUTROPHILS # BLD AUTO: 1.6 10^3/UL (ref 1.8–7.7)
NEUTROPHILS NFR BLD AUTO: 47.9 % (ref 36–66)
PLATELET # BLD AUTO: 133 10^3/UL (ref 150–450)
RBC # BLD AUTO: 2.93 10^6/UL (ref 4.3–6.1)
WBC # BLD AUTO: 3.3 10^3/UL (ref 4–10)

## 2019-02-04 LAB
HCT VFR BLD AUTO: 28.5 % (ref 42–52)
HGB BLD-MCNC: 9.2 G/DL (ref 13.5–17.5)
LYMPHOCYTES NFR BLD AUTO: 35.8 % (ref 24–44)
MCH RBC QN AUTO: 31.3 PG (ref 27–33)
MCHC RBC AUTO-ENTMCNC: 32.3 G/DL (ref 32–36.5)
MCV RBC AUTO: 97.1 FL (ref 80–96)
NEUTROPHILS # BLD AUTO: 1.7 10^3/UL (ref 1.8–7.7)
NEUTROPHILS NFR BLD AUTO: 41.3 % (ref 36–66)
PLATELET # BLD AUTO: 129 10^3/UL (ref 150–450)
RBC # BLD AUTO: 2.94 10^6/UL (ref 4.3–6.1)
WBC # BLD AUTO: 4 10^3/UL (ref 4–10)

## 2019-02-11 VITALS — SYSTOLIC BLOOD PRESSURE: 120 MMHG | DIASTOLIC BLOOD PRESSURE: 79 MMHG

## 2019-02-11 LAB
ALBUMIN SERPL BCG-MCNC: 4.4 GM/DL (ref 3.5–5.2)
BUN SERPL-MCNC: 21 MG/DL (ref 6–20)
CALCIUM SERPL-MCNC: 9.6 MG/DL (ref 8.5–10.2)
CHLORIDE SERPL-SCNC: 104 MMOL/L (ref 98–107)
CO2 SERPL-SCNC: 30 MEQ/L (ref 23–31)
CREAT SERPL-MCNC: 1.48 MG/DL (ref 0.9–1.3)
GFR SERPL CREATININE-BSD FRML MDRD: 48 ML/MIN/{1.73_M2} (ref 35–?)
GLUCOSE SERPL-MCNC: 120 MG/DL (ref 70–105)
HCT VFR BLD AUTO: 28.3 % (ref 42–52)
HGB BLD-MCNC: 9.1 G/DL (ref 13.5–17.5)
LYMPHOCYTES NFR BLD AUTO: 34.6 % (ref 24–44)
MCH RBC QN AUTO: 31.1 PG (ref 27–33)
MCHC RBC AUTO-ENTMCNC: 32.2 G/DL (ref 32–36.5)
MCV RBC AUTO: 96.6 FL (ref 80–96)
NEUTROPHILS # BLD AUTO: 1.9 10^3/UL (ref 1.8–7.7)
NEUTROPHILS NFR BLD AUTO: 46 % (ref 36–66)
PLATELET # BLD AUTO: 112 10^3/UL (ref 150–450)
POTASSIUM SERPL-SCNC: 4.4 MMOL/L (ref 3.5–5.1)
PROT SERPL-MCNC: 7.1 GM/DL (ref 6.4–8.3)
RBC # BLD AUTO: 2.93 10^6/UL (ref 4.3–6.1)
SODIUM SERPL-SCNC: 141 MMOL/L (ref 136–145)
WBC # BLD AUTO: 4.1 10^3/UL (ref 4–10)

## 2019-02-13 NOTE — MEDONC
MEDICAL ONCOLOGY HEMATOLOGY FOLLOWUP/TREATMENT VISIT

 

DATE OF SERVICE:  02/11/2019

 

Kev is seen today by BERNARD Cruz with Dr. Morley the supervising

physician.

 

DIAGNOSIS:

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

sideroblasts, atypical JAK2 positive with normal cellular marrow, absence of

myelofibrosis, thrombocytosis or leukocytosis, normal cytogenetics on bone marrow

biopsy 2016 and 2017.  Managed with the erythropoietin 60,000 units weekly p.r.n.

for hemoglobin less than 12.  Also managed with as needed transfusion for

symptomatic anemia and a hemoglobin in the 8 range.

 

CURRENT THERAPY:

Erythropoietin margarito 60,000 units weekly, hold for hemoglobin 12 or above.  Most

recent RBC transfusion 12/10/2018 (1 unit) for a hemoglobin of 8.6, hematocrit

27.3.

 

INTERVAL HISTORY:

Kev is seen today for a brief follow-up visit.  With the exception of chronic

mild fatigue, he feels well.  ECOG performance status 0-1, limited only by

advanced age.  At present, Kev specifically denies complaints of chest pain,

dyspnea, palpitations, extremity edema, recurrent infections, fevers, chills,

spontaneous bleeding or ease of bruising.

 

VITAL SIGNS: Weight is 88.2 kg, temperature 98.1, pulse 86, respirations 24, BP

120/79, O2 sat 98% at rest on room air.  Physical exam deferred.

 

LABORATORY DATA:

WBC 4.1, ANC 1.9, RBC 2.93, hemoglobin and hematocrit 9.1, 28.3, platelets 112.

 

IMPRESSION:

Kev is a very pleasant 86-year-old male with IPSS-R-2, low risk myelodysplastic

syndrome, refractory anemia without ringed sideroblasts, atypical JAK2 positive

with normal cellular marrow, absence of myelofibrosis, normal cytogenetics.

Managed with the erythropoietin 60,000 units weekly and p.r.n. RBC transfusion.

 

PLAN:

1.  Erythropoietin 60,000 units subcu today.

2.  Return to clinic weekly times four with a CBC and a pink top tube for type

and cross (to hold).  Kev will receive Procrit for a hemoglobin less than 12.

3.  The patient will be seen for brief office visit when he is here in 4 weeks or

certainly sooner if any new symptoms, questions or problems.

 

 

 

 

 

 

 

Reviewed by

Arminda Christie NP 02/13/2019 03:39 P

 

 

Edited and Electronically Signed by

Derek Morley MD 02/24/2019 01:08 P

 

 

 

 

DD:  Arminda Christie NP 02/11/2019 09:37 A

DT:  vick 02/13/2019 09:45 A

 

CC:   Arsenio Velez Jr, MD Vojtech Slezka, MD

## 2019-02-18 LAB
HCT VFR BLD AUTO: 29.5 % (ref 42–52)
HGB BLD-MCNC: 9.3 G/DL (ref 13.5–17.5)
LYMPHOCYTES NFR BLD AUTO: 32.6 % (ref 24–44)
MCH RBC QN AUTO: 30.8 PG (ref 27–33)
MCHC RBC AUTO-ENTMCNC: 31.5 G/DL (ref 32–36.5)
MCV RBC AUTO: 97.7 FL (ref 80–96)
NEUTROPHILS # BLD AUTO: 1.8 10^3/UL (ref 1.8–7.7)
NEUTROPHILS NFR BLD AUTO: 46 % (ref 36–66)
PLATELET # BLD AUTO: 122 10^3/UL (ref 150–450)
RBC # BLD AUTO: 3.02 10^6/UL (ref 4.3–6.1)
WBC # BLD AUTO: 4 10^3/UL (ref 4–10)

## 2019-02-25 VITALS — DIASTOLIC BLOOD PRESSURE: 67 MMHG | SYSTOLIC BLOOD PRESSURE: 125 MMHG

## 2019-02-25 LAB
HCT VFR BLD AUTO: 28.8 % (ref 42–52)
HGB BLD-MCNC: 9.1 G/DL (ref 13.5–17.5)
LYMPHOCYTES NFR BLD AUTO: 33.7 % (ref 24–44)
MCH RBC QN AUTO: 30.8 PG (ref 27–33)
MCHC RBC AUTO-ENTMCNC: 31.6 G/DL (ref 32–36.5)
MCV RBC AUTO: 97.6 FL (ref 80–96)
NEUTROPHILS # BLD AUTO: 1.6 10^3/UL (ref 1.8–7.7)
NEUTROPHILS NFR BLD AUTO: 46.3 % (ref 36–66)
PLATELET # BLD AUTO: 118 10^3/UL (ref 150–450)
RBC # BLD AUTO: 2.95 10^6/UL (ref 4.3–6.1)
WBC # BLD AUTO: 3.4 10^3/UL (ref 4–10)

## 2019-03-04 LAB
BASOPHILS # BLD AUTO: 0 10^3/UL (ref 0–0.2)
BASOPHILS NFR BLD AUTO: 0.8 % (ref 0–1)
EOSINOPHIL # BLD AUTO: 0 10^3/UL (ref 0–0.5)
EOSINOPHIL NFR BLD AUTO: 0.8 % (ref 0–3)
HCT VFR BLD AUTO: 30.5 % (ref 42–52)
HGB BLD-MCNC: 9.3 G/DL (ref 13.5–17.5)
LYMPHOCYTES # BLD AUTO: 1 10^3/UL (ref 1.5–4.5)
LYMPHOCYTES NFR BLD AUTO: 26.2 % (ref 24–44)
MCH RBC QN AUTO: 30 PG (ref 27–33)
MCHC RBC AUTO-ENTMCNC: 30.5 G/DL (ref 32–36.5)
MCV RBC AUTO: 98.4 FL (ref 80–96)
MONOCYTES # BLD AUTO: 1 10^3/UL (ref 0–0.8)
MONOCYTES NFR BLD AUTO: 25.7 % (ref 0–5)
NEUTROPHILS # BLD AUTO: 1.8 10^3/UL (ref 1.8–7.7)
NEUTROPHILS NFR BLD AUTO: 45.7 % (ref 36–66)
PLATELET # BLD AUTO: 119 10^3/UL (ref 150–450)
RBC # BLD AUTO: 3.1 10^6/UL (ref 4.3–6.1)
WBC # BLD AUTO: 3.9 10^3/UL (ref 4–10)

## 2019-03-11 VITALS — DIASTOLIC BLOOD PRESSURE: 59 MMHG | SYSTOLIC BLOOD PRESSURE: 123 MMHG

## 2019-03-11 LAB
HCT VFR BLD AUTO: 29.6 % (ref 42–52)
HGB BLD-MCNC: 9.2 G/DL (ref 13.5–17.5)
LYMPHOCYTES NFR BLD AUTO: 34.4 % (ref 24–44)
MCH RBC QN AUTO: 29.9 PG (ref 27–33)
MCHC RBC AUTO-ENTMCNC: 31.1 G/DL (ref 32–36.5)
MCV RBC AUTO: 96.1 FL (ref 80–96)
NEUTROPHILS # BLD AUTO: 1.6 10^3/UL (ref 1.8–7.7)
NEUTROPHILS NFR BLD AUTO: 43.5 % (ref 36–66)
PLATELET # BLD AUTO: 149 10^3/UL (ref 150–450)
RBC # BLD AUTO: 3.08 10^6/UL (ref 4.3–6.1)
WBC # BLD AUTO: 3.7 10^3/UL (ref 4–10)

## 2019-03-18 LAB
HCT VFR BLD AUTO: 28.3 % (ref 42–52)
HGB BLD-MCNC: 9 G/DL (ref 13.5–17.5)
LYMPHOCYTES NFR BLD AUTO: 39.5 % (ref 24–44)
MCH RBC QN AUTO: 30.8 PG (ref 27–33)
MCHC RBC AUTO-ENTMCNC: 31.8 G/DL (ref 32–36.5)
MCV RBC AUTO: 97 FL (ref 80–96)
NEUTROPHILS # BLD AUTO: 1.4 10^3/UL (ref 1.8–7.7)
NEUTROPHILS NFR BLD AUTO: 38.6 % (ref 36–66)
PLATELET # BLD AUTO: 145 10^3/UL (ref 150–450)
RBC # BLD AUTO: 2.92 10^6/UL (ref 4.3–6.1)
WBC # BLD AUTO: 3.7 10^3/UL (ref 4–10)

## 2019-03-18 NOTE — MEDONC
MEDICAL ONCOLOGY FOLLOWUP / TREATMENT VISIT

 

DATE OF SERVICE:  03/11/2019

 

DIAGNOSIS:

IPSS - R-2 low-risk myelodysplastic syndrome/refractory anemia without ringed

sideroblasts, atypical JAK2 positive with normocellular bone marrow, normal

cytogenetics 2017, diagnosed 2017 managed with weekly erythropoietin holding for

hemoglobin 12 or greater most recent RBC transfusion 12/11/2018.

 

CURRENT THERAPY:

Erythropoietin alpha 60,000 units weekly hold for hemoglobin 12 or above.

Transfusion parameters:  For hemoglobin below or near 8.5 one unit, for

hemoglobin below 7.5 two units.

Atrial fibrillation on anticoagulation.

Chronic kidney disease, stage III.

Systolic and diastolic CHF followed by Dr. Flores.

 

 

INTERVAL HISTORY:

Bowen is here for CBC and EPO, H H today 9.2 and 29 respectively, essentially

stable on current dose.  Platelets are 149, WBC 3.7, ANC 1600.  He has no new

complaints.  Denies any major new medical issues, continues to follow with many

doctors.

 

REVIEW OF SYSTEMS:

In addition to pertinent positives and negatives above Bowen denies any itching,

numbness, tingling, chest pain, palpitations, falls.  Remainder of 12 system

review negative.

 

PHYSICAL EXAMINATION:

Weight is 86 kg, temperature 98, blood pressure 123/59, heart rate 92,

respiratory 20, O2 sat is 97%.  Patient is a tall, normal weight, well-groomed

older gentleman in no distress.

Respiratory:  Clear to auscultation bilaterally.  No wheezes.  No rales.

Cardiac:  S1-S2 irregularly irregular, 2/6 systolic murmur.

Abdomen:  Soft, nontender, nondistended, no hepatosplenomegaly or mass.

Extremities:  No edema.

Lymph nodes:  No submandibular, cervical, supraclavicular or axillary adenopathy

bilaterally.

 

WBC 3.7, hemoglobin 9.2, hematocrit 29, platelets 149, MCV 96.

 

IMPRESSION:

Bowen Willett is an 86-year-old man with low-risk MDS/refractory anemia stably

maintaining hemoglobin around 9 with epoetin margarito 60,000 units weekly.  He has

not received a transfusion since December.  No downward trend in H H.  Clinically

stable.

 

PLAN:

 

1.  Continue weekly CBCs and epoetin as needed holding for hemoglobin 12 or

higher.

 

 

 

Electronically Signed by

Chuyita Lynch MD 03/19/2019 07:33 P

 

 

 

 

 

 

 

 

DD:  Chuyita Lynch MD 03/15/2019 05:45 P

DT:  pat 03/18/2019 08:27 A

 

CC:   Arsenio Velez Jr, MD Vojtech Slezka, MD

## 2019-03-25 VITALS — DIASTOLIC BLOOD PRESSURE: 64 MMHG | SYSTOLIC BLOOD PRESSURE: 110 MMHG

## 2019-03-25 LAB
HCT VFR BLD AUTO: 27.1 % (ref 42–52)
HGB BLD-MCNC: 8.4 G/DL (ref 13.5–17.5)
LYMPHOCYTES NFR BLD AUTO: 27.5 % (ref 24–44)
MCH RBC QN AUTO: 29.8 PG (ref 27–33)
MCHC RBC AUTO-ENTMCNC: 31 G/DL (ref 32–36.5)
MCV RBC AUTO: 96.2 FL (ref 80–96)
NEUTROPHILS # BLD AUTO: 1.6 10^3/UL (ref 1.8–7.7)
NEUTROPHILS NFR BLD AUTO: 50.5 % (ref 36–66)
PLATELET # BLD AUTO: 160 10^3/UL (ref 150–450)
RBC # BLD AUTO: 2.82 10^6/UL (ref 4.3–6.1)
WBC # BLD AUTO: 3.2 10^3/UL (ref 4–10)

## 2019-04-01 LAB
HCT VFR BLD AUTO: 27.6 % (ref 42–52)
HGB BLD-MCNC: 8.7 G/DL (ref 13.5–17.5)
LYMPHOCYTES NFR BLD AUTO: 37.6 % (ref 24–44)
MCH RBC QN AUTO: 30.4 PG (ref 27–33)
MCHC RBC AUTO-ENTMCNC: 31.5 G/DL (ref 32–36.5)
MCV RBC AUTO: 96.4 FL (ref 80–96)
NEUTROPHILS # BLD AUTO: 1.5 10^3/UL (ref 1.8–7.7)
NEUTROPHILS NFR BLD AUTO: 42.4 % (ref 36–66)
PLATELET # BLD AUTO: 125 10^3/UL (ref 150–450)
RBC # BLD AUTO: 2.86 10^6/UL (ref 4.3–6.1)
WBC # BLD AUTO: 3.5 10^3/UL (ref 4–10)

## 2019-04-08 VITALS — DIASTOLIC BLOOD PRESSURE: 70 MMHG | SYSTOLIC BLOOD PRESSURE: 108 MMHG

## 2019-04-08 VITALS — SYSTOLIC BLOOD PRESSURE: 106 MMHG | DIASTOLIC BLOOD PRESSURE: 72 MMHG

## 2019-04-08 VITALS — DIASTOLIC BLOOD PRESSURE: 75 MMHG | SYSTOLIC BLOOD PRESSURE: 114 MMHG

## 2019-04-08 LAB
FERRITIN SERPL-MCNC: 19 NG/ML (ref 26–388)
HCT VFR BLD AUTO: 24.8 % (ref 42–52)
HGB BLD-MCNC: 7.9 G/DL (ref 13.5–17.5)
IGA SERPL-MCNC: 305 MG/DL (ref 70–400)
IGG SERPL-MCNC: 974 MG/DL (ref 681–1648)
IGM SERPL-MCNC: 170 MG/DL (ref 40–230)
IRON SATN MFR SERPL: 16.9 % (ref 19.7–50)
IRON SERPL-MCNC: 54 UG/DL (ref 65–175)
LYMPHOCYTES NFR BLD AUTO: 26.4 % (ref 24–44)
MCH RBC QN AUTO: 30.5 PG (ref 27–33)
MCHC RBC AUTO-ENTMCNC: 31.9 G/DL (ref 32–36.5)
MCV RBC AUTO: 95.7 FL (ref 80–96)
NEUTROPHILS # BLD AUTO: 1.4 10^3/UL (ref 1.8–7.7)
NEUTROPHILS NFR BLD AUTO: 47.3 % (ref 36–66)
PLATELET # BLD AUTO: 129 10^3/UL (ref 150–450)
PROT SERPL-MCNC: 6.8 GM/DL (ref 6.4–8.2)
RBC # BLD AUTO: 2.59 10^6/UL (ref 4.3–6.1)
TIBC SERPL-MCNC: 320 UG/DL (ref 250–450)
VIT B12 SERPL-MCNC: 942 PG/ML (ref 247–911)
WBC # BLD AUTO: 3 10^3/UL (ref 4–10)

## 2019-04-08 NOTE — MEDONC
MEDICAL HEMATOLOGY ONCOLOGY FOLLOWUP/TREATMENT VISIT

 

DATE OF SERVICE:  04/08/2019

 

DIAGNOSIS:

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

sideroblasts, atypical JAK2 positive with normal cellular bone marrow, normal

cytogenetics 2017, diagnosed 2017, managed with weekly erythropoietin holding for

hemoglobin 12 or greater.

 

CURRENT THERAPY:

Erythropoietin alpha 60,000 units weekly hold for hemoglobin 12 or above.

Transfusions Parameters:  For hemoglobin below or near 8.5, 1 unit; for

hemoglobin below 7.5, 2 units.

Atrial fibrillation on anticoagulation.

Chronic kidney disease stage 3.

Systolic and diastolic CHF followed by Dr. Flores.

 

INTERVAL HISTORY:

Kev is here for CBC and Epo.  His H H today are down to 7.9 and 24.8 with a

normal MCV and MCH.  White blood cells 3.0, ANC 1.4 (stable), platelets 129,000.

Kev report mild progressive fatigue today.  Offers no other specific complaints.

Denies dyspnea, chest pain or extremity edema.

 

REVIEW OF SYSTEMS:

In addition to pertinent positives and negatives above, Kev denies any itching,

numbness, tingling, any obvious sources of GI blood loss.  Remainder of 12 system

review is negative.

 

PHYSICAL EXAMINATION:

Weight is 87 kg, temperature is 98.5, pulse 69, respirations 24, /72, O2

sat 96% at rest on room air.

GENERAL:  Exam reveals a tall, normal weight, well-groomed older gentleman in no

distress.

CARDIAC:  Is S1-S2 irregular irregular, 2/6 systolic murmur.

RESPIRATORY:  Lungs clear bilaterally to auscultation and percussion.  No rales,

rhonchi or wheezing.

ABDOMEN:  Soft, nontender, bowel sounds normal, no tenderness, guarding or

rebound, no palpable masses or hepatosplenomegaly.

EXTREMITIES:  No edema, clubbing or cyanosis.  No thigh or calf tenderness.

Normal range of motion.

 

 

LABORATORY DATA:

As above.  In addition, today we are obtaining an SPEP, serum free light chains,

quantitative immunoglobulins, current iron studies, vitamin B12 level, [place]

direct, indirect and haptoglobin.

 

IMPRESSION:

Kev is a miguel 86-year-old man with low risk, MDS refractory anemia with a

significant drop in his H H today requiring transfusion.  We will arrange for him

to receive 1 unit of packed red blood cells today.  He will also be given 60,000

units of Procrit.

 

PLAN:

 

1.  Procrit and 1 unit of packed RBCs today.

2.  For further evaluation of the patient's progressive anemia, we will await the

aforementioned lab results.

3.  Return in 1 week for followup with Dr. Lynch, physical exam, blood work and

possible Procrit.

 

 

 

 

Electronically Signed by

Arminda Christie NP 04/09/2019 07:10 A

 

 

 

 

 

 

 

 

DD:  Arminda Christie NP 04/08/2019 08:29 A

DT:  pat 04/08/2019 08:53 A

 

CC:   Arsenio Velez Jr, MD Vojtech Slezka, MD

## 2019-04-09 LAB
ALBUMIN MFR UR ELPH: 59.4 % (ref 55.8–66.1)
ALBUMIN SERPL-MCNC: 4.04 GM/DL (ref 3.29–5.55)
ALPHA1 GLOB MFR SERPL ELPH: 5 % (ref 2.9–4.9)
ALPHA1 GLOB SERPL ELPH-MCNC: 0.34 GM/DL (ref 0.17–0.41)
ALPHA2 GLOB SERPL ELPH-MCNC: 0.55 GM/DL (ref 0.42–0.99)
ALPHA2 GLOB SERPL ELPH-MCNC: 8.1 % (ref 7.1–11.8)
B-GLOBULIN SERPL ELPH-MCNC: 0.44 GM/DL (ref 0.28–0.6)
BETA1 GLOB MFR SERPL ELPH: 6.4 % (ref 4.7–7.2)
BETA2 GLOB MFR SERPL ELPH: 5.3 % (ref 3.2–6.5)
BETA2 GLOB SERPL ELPH-MCNC: 0.36 GM/DL (ref 0.19–0.55)
GAMMA GLOB 24H MFR UR ELPH: 15.8 % (ref 11.1–18.8)
GAMMA GLOB SERPL ELPH-MCNC: 1.07 GM/DL (ref 0.65–1.58)
PROT PATTERN SERPL ELPH-IMP: (no result)

## 2019-04-10 LAB
HAPTOGLOB SERPL-MCNC: 71 MG/DL (ref 34–200)
KAPPA LC FREE SER-MCNC: 46.6 MG/L (ref 3.3–19.4)
KAPPA LC/LAMBDA SER: 1.37 {RATIO} (ref 0.26–1.65)
LAMBDA LC FREE SERPL-MCNC: 34 MG/L (ref 5.7–26.3)

## 2019-04-10 NOTE — MEDONC
TELEPHONE NOTE:

 

DATE OF SERVICE: 04/09/2019

 

Kev was contacted today with results of additional blood work done on 04/08/2019

to further evaluate progressive anemia.  At yesterday's visit, Kev's hemoglobin

and hematocrit were down to 7.9 and 24.8 despite 60,000 units of Procrit weekly.

Additional blood work done to further evaluate the patient's progressive anemia

included a haptoglobin which was within normal parameters, vitamin B12 elevated

at 942, an SPEP which revealed an essentially normal pattern, normal quantitative

immunoglobulins, serum free light chains revealed elevated kappa light chains at

46.6, lambda light chains also elevated at 34.0, kappa lambda ratio within normal

parameters at 1.37. Iron studies showed a serum iron of 54, TIBC 320, 16.9%

saturation and a ferritin of 19.  These results were reviewed with Kev over the

phone today to his understanding.

 

Based on evidence of iron deficiency anemia, Kev will be started on Ferrex 150 mg

p.o. b.i.d. A prescription for  Ferrex was E-scribed to the patient's pharmacy.

When he returns in 1 week, we will further discuss the patient's new onset of

iron deficiency anemia and discuss ongoing workup/management options.  At

present, Kev denies any obvious source of blood loss.  Of note, however, he is on

Xarelto for a history of atrial fibrillation.  At yesterday's visit, Kev was

given 60,000 units of Procrit and also transfused 1 unit of packed red blood

cells without incident.

 

 

 

Electronically Signed by

Arminda Christie NP 04/10/2019 10:38 A

 

 

 

 

 

 

 

 

DD:  Arminda Christie NP 04/10/2019 07:24 A

DT:  anshul 04/10/2019 09:25 A

 

CC:   Arsenio Velez Jr, MD Vojtech Slezka, MD

## 2019-04-15 VITALS — DIASTOLIC BLOOD PRESSURE: 65 MMHG | SYSTOLIC BLOOD PRESSURE: 115 MMHG

## 2019-04-15 LAB
HCT VFR BLD AUTO: 27.1 % (ref 42–52)
HGB BLD-MCNC: 8.7 G/DL (ref 13.5–17.5)
LYMPHOCYTES NFR BLD AUTO: 29.6 % (ref 24–44)
MCH RBC QN AUTO: 31.2 PG (ref 27–33)
MCHC RBC AUTO-ENTMCNC: 32.1 G/DL (ref 32–36.5)
MCV RBC AUTO: 97.3 FL (ref 80–96)
NEUTROPHILS # BLD AUTO: 2 10^3/UL (ref 1.8–7.7)
NEUTROPHILS NFR BLD AUTO: 55 % (ref 36–66)
PLATELET # BLD AUTO: 108 10^3/UL (ref 150–450)
RBC # BLD AUTO: 2.79 10^6/UL (ref 4.3–6.1)
WBC # BLD AUTO: 3.7 10^3/UL (ref 4–10)

## 2019-04-15 NOTE — MEDONC
MEDICAL ONCOLOGY/HEMATOLOGY FOLLOWUP/TREATMENT VISIT

 

DATE OF SERVICE:  04/15/2019

 

DIAGNOSIS:

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

sideroblasts, atypical JAK2 positive with normal cellular bone marrow, normal

cytogenetics 2017, diagnosed 2017, managed with weekly erythropoietin holding for

hemoglobin 12 or greater, most recent RBC transfusion 04/08/2019.

 

CURRENT THERAPY:

Erythropoietin margarito 60,000 units weekly hold for hemoglobin 12 or above.

 

TRANSFUSION PARAMETERS:

For hemoglobin below or near 8.5, 1 unit, for hemoglobin below 7.5, 2 units.

Atrial fibrillation on anticoagulation.

Chronic kidney disease stage III.

Systolic and diastolic CHF followed by Dr. Flores.

 

INTERVAL HISTORY:

Kev is here for a CBC, followup office visit and possible EPO.  When he was seen

most recently on 04/08/2019 his hemoglobin and hematocrit had drifted down to 7.9

and 24.8 from 8.7 and 27.6 one week prior.  Additional laboratory testing done

that day included iron studies that indicated a serum iron of 54, TIBC 320, 16.9%

saturation and a ferritin of 19.  Vitamin B12 level was within normal parameters.

SPEP indicated an essentially normal pattern, quantitative immunoglobulins were

within normal parameters, haptoglobin also within normal parameters.

 

Kev was subsequently transfused 1 unit of packed red blood cells on 04/08/2019

without incident.  He was also given 60,000 units of Procrit and also since his

last visit started on Ferrex 150 mg p.o. b.i.d.

 

Kev presents today for scheduled followup.  With the exception of persistent mild

fatigue he offers no complaints.  Specifically denies chest pain, palpitations,

dyspnea or extremity edema.  Kev also adamantly denies any obvious source of GI

blood loss.

 

LABORATORY DATA:

CBC today WBC 3.7, ANC 2.0, RBC 2.79, hemoglobin and hematocrit 8.7/27.1,

platelets 108,000.

 

IMPRESSION:

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

sideroblasts, atypical JAK2 positive with normal cellular bone marrow, normal

cytogenetics 2017, presenting 1 week ago with a unusual drop in the hemoglobin

and hematocrit.  Kev was subsequently transfused 1 unit of packed red blood cells

without incident.  He was also given 60,000 units of Procrit on 04/08/2019 and

subsequently started on Ferrex 150 mg p.o. b.i.d.  Kev presents today with his

hemoglobin and hematocrit back into a stable/acceptable range.  Upon review of

the  CBC's, over the past few weeks Kev has had mild progressive

thrombocytopenia, he denies any excessive ease of bruising but does have a

history of easier bruising secondary to Xarelto.  No spontaneous nosebleeds.  No

other spontaneous bleeding.

 

PLAN:

1.  Procrit 60,000 units today.

2.  The patient will be given hemoccult cards to bring back to his next visit.

3.  Schedule weekly CBC, weight and possible Procrit with an extra pink top tube

to hold.

4.  Kev will be seen for a follow-up visit when he is here in 4 weeks, possible

flow cytometry, peripheral smear, and even bone marrow in the future should the

patient have progressive anemia despite his weekly Procrit, initiation of Ferrex

and should his transfusion requirement become more frequent.

 

 

 

 

 

 

 

Electronically Signed by

Arminda Christie NP 04/16/2019 07:22 A

 

 

 

 

 

 

 

 

DD:  Arminda Christie NP 04/15/2019 01:32 P

DT:  vick 04/15/2019 02:19 P

 

CC:   Arsenio Velez Jr, MD Vojtech Slezka, MD

## 2019-04-23 VITALS — DIASTOLIC BLOOD PRESSURE: 63 MMHG | SYSTOLIC BLOOD PRESSURE: 112 MMHG

## 2019-04-23 LAB
BASOPHILS # BLD AUTO: 0 10^3/UL (ref 0–0.2)
BASOPHILS NFR BLD AUTO: 1 % (ref 0–1)
EOSINOPHIL # BLD AUTO: 0 10^3/UL (ref 0–0.5)
EOSINOPHIL NFR BLD AUTO: 1 % (ref 0–3)
HCT VFR BLD AUTO: 27.1 % (ref 42–52)
HGB BLD-MCNC: 8.2 G/DL (ref 13.5–17.5)
LYMPHOCYTES # BLD AUTO: 0.6 10^3/UL (ref 1.5–4.5)
LYMPHOCYTES NFR BLD AUTO: 19.7 % (ref 24–44)
MCH RBC QN AUTO: 30.7 PG (ref 27–33)
MCHC RBC AUTO-ENTMCNC: 30.3 G/DL (ref 32–36.5)
MCV RBC AUTO: 101.5 FL (ref 80–96)
MONOCYTES # BLD AUTO: 0.9 10^3/UL (ref 0–0.8)
MONOCYTES NFR BLD AUTO: 30.7 % (ref 0–5)
NEUTROPHILS # BLD AUTO: 1.4 10^3/UL (ref 1.8–7.7)
NEUTROPHILS NFR BLD AUTO: 46.9 % (ref 36–66)
PLATELET # BLD AUTO: 112 10^3/UL (ref 150–450)
RBC # BLD AUTO: 2.67 10^6/UL (ref 4.3–6.1)
WBC # BLD AUTO: 3 10^3/UL (ref 4–10)

## 2019-04-29 VITALS — DIASTOLIC BLOOD PRESSURE: 67 MMHG | SYSTOLIC BLOOD PRESSURE: 124 MMHG

## 2019-04-29 LAB
FERRITIN SERPL-MCNC: 38 NG/ML (ref 26–388)
HCT VFR BLD AUTO: 29.5 % (ref 42–52)
HGB BLD-MCNC: 9.3 G/DL (ref 13.5–17.5)
IRON SATN MFR SERPL: 70.8 % (ref 19.7–50)
IRON SERPL-MCNC: 211 UG/DL (ref 65–175)
LYMPHOCYTES NFR BLD AUTO: 27.5 % (ref 24–44)
MCH RBC QN AUTO: 31.2 PG (ref 27–33)
MCHC RBC AUTO-ENTMCNC: 31.5 G/DL (ref 32–36.5)
MCV RBC AUTO: 99 FL (ref 80–96)
NEUTROPHILS # BLD AUTO: 1.5 10^3/UL (ref 1.8–7.7)
NEUTROPHILS NFR BLD AUTO: 48.5 % (ref 36–66)
PLATELET # BLD AUTO: 134 10^3/UL (ref 150–450)
RBC # BLD AUTO: 2.98 10^6/UL (ref 4.3–6.1)
TIBC SERPL-MCNC: 298 UG/DL (ref 250–450)
WBC # BLD AUTO: 3.1 10^3/UL (ref 4–10)

## 2019-05-07 VITALS — DIASTOLIC BLOOD PRESSURE: 63 MMHG | SYSTOLIC BLOOD PRESSURE: 108 MMHG

## 2019-05-07 LAB
FERRITIN SERPL-MCNC: 33 NG/ML (ref 26–388)
HCT VFR BLD AUTO: 29.3 % (ref 42–52)
HGB BLD-MCNC: 9.2 G/DL (ref 13.5–17.5)
IRON SATN MFR SERPL: 58.3 % (ref 19.7–50)
IRON SERPL-MCNC: 162 UG/DL (ref 65–175)
LYMPHOCYTES NFR BLD AUTO: 31 % (ref 24–44)
MCH RBC QN AUTO: 31.5 PG (ref 27–33)
MCHC RBC AUTO-ENTMCNC: 31.4 G/DL (ref 32–36.5)
MCV RBC AUTO: 100.2 FL (ref 80–96)
NEUTROPHILS # BLD AUTO: 1.6 10^3/UL (ref 1.8–7.7)
NEUTROPHILS NFR BLD AUTO: 46 % (ref 36–66)
PLATELET # BLD AUTO: 124 10^3/UL (ref 150–450)
RBC # BLD AUTO: 2.92 10^6/UL (ref 4.3–6.1)
TIBC SERPL-MCNC: 278 UG/DL (ref 250–450)
WBC # BLD AUTO: 3.4 10^3/UL (ref 4–10)

## 2019-05-13 VITALS — DIASTOLIC BLOOD PRESSURE: 72 MMHG | SYSTOLIC BLOOD PRESSURE: 115 MMHG

## 2019-05-13 LAB
FERRITIN SERPL-MCNC: 26 NG/ML (ref 26–388)
HCT VFR BLD AUTO: 30.6 % (ref 42–52)
HGB BLD-MCNC: 9.7 G/DL (ref 13.5–17.5)
IRON SATN MFR SERPL: 76.1 % (ref 19.7–50)
IRON SERPL-MCNC: 207 UG/DL (ref 65–175)
LYMPHOCYTES NFR BLD AUTO: 31.4 % (ref 24–44)
MCH RBC QN AUTO: 31.7 PG (ref 27–33)
MCHC RBC AUTO-ENTMCNC: 31.7 G/DL (ref 32–36.5)
MCV RBC AUTO: 100 FL (ref 80–96)
NEUTROPHILS # BLD AUTO: 1.3 10^3/UL (ref 1.8–7.7)
NEUTROPHILS NFR BLD AUTO: 43.5 % (ref 36–66)
PLATELET # BLD AUTO: 145 10^3/UL (ref 150–450)
RBC # BLD AUTO: 3.06 10^6/UL (ref 4.3–6.1)
TIBC SERPL-MCNC: 272 UG/DL (ref 250–450)
WBC # BLD AUTO: 3.1 10^3/UL (ref 4–10)

## 2019-05-13 NOTE — MED[facility]
HEMATOLOGY FOLLOWUP/TREATMENT VISIT

 

DATE OF SERVICE:  05/13/2019

 

DIAGNOSIS:

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

sideroblasts, atypical JAK2 positive with normal cellular bone marrow, normal

cytogenetics 2017, diagnosed 2017, managed with weekly erythropoietin, holding

for hemoglobin 12 or greater, most recent RBC transfusion 04/08/2019.

 

Iron deficiency anemia, unknown source of blood loss.

 

CURRENT THERAPY:

Erythropoietin margarito 60,000 units weekly, hold for hemoglobin 12 or above.

Ferrex 150 mg p.o. daily.

 

TRANSFUSION PARAMETERS:

For hemoglobin below or near 8.5, one units; for hemoglobin below 7.5, two

units.

 

OTHER COMORBIDITIES:

Atrial fibrillation on indefinite anticoagulation.

Chronic kidney disease, stage III.

Systolic and diastolic CHF, followed by Dr. Flores.

 

INTERVAL HISTORY:

Kev presents today for a follow-up visit.  At present he claims to feel well.

His hemoglobin and hematocrit are up a little bit today to 9.7 and 30.6 with a

stable white blood cell count of 3.1, ANC 1.3 and platelets 145,000.  Current

iron studies indicate a serum iron of 207, TIBC 272, 76.1% saturation and a

ferritin of 26.  For comparison sake, prior to initiating Ferrex, serum iron was

54, TIBC 320, 16.9% saturation, ferritin was 19.

 

Vital Signs: Reviewed today and are stable.  Weight 85.9 kg, temperature 97.6,

pulse 94, respirations 18, /72, O2 sat 99% at rest on room air.

Physical exam deferred.

 

ASSESSMENT AND PLAN:

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

sideroblasts, atypical JAK2 positive with normal cellular bone marrow, normal

cytogenetics 2017, new finding of iron deficiency anemia with Hemoccult cards

done on 04/20/2019, 1 out of 3 positive.  To date, Kev has denied any obvious

source of GI blood loss.  He was subsequently started on Ferrex 150 mg p.o.

b.i.d., cut back to daily dosing secondary to abdominal cramping and constipation

with b.i.d. dosing.  At today's visit, we discussed the recommendation for

referral to gastroenterology for endoscopy.  Mrs. Willett indicates that Kev was

referred approximately 2 years ago to Dr. Muniz by Dr. Velez and due to

Kev's multiple comorbidities and advanced age, he advised against endoscopy at

that time.  Kev will discuss this again with Dr. Velez.  In the meantime, he

will continue with Ferrex 1 mg p.o. daily.  We will watch his iron studies

carefully.  Kev will be given 60,000 units of Procrit today.  We will check a CBC

weekly with a pink top tube drawn to hold.  Kev will be given 60,000 units of

Procrit weekly for a hemoglobin less than 12.  I will see him in 1 month for an

office visit, physical exam and for further decision making.  We will repeat iron

studies at that time.  If his iron remains replenished, we will have Kev

discontinue the Ferrex.  We will also defer to Dr. Velez's advice regarding

recommendations for referral for endoscopy.

 

 

 

Electronically Signed by

Arimnda Christie NP 05/14/2019 03:59 P

 

 

 

 

 

 

 

 

DD:  Arminda Christie NP 05/13/2019 01:49 P

DT:  momo 05/13/2019 09:31 P

 

CC:   Arsenio Velez Jr, MD Vojtech Slezka, MD

## 2019-05-20 LAB
HCT VFR BLD AUTO: 28.4 % (ref 42–52)
HGB BLD-MCNC: 9.2 G/DL (ref 13.5–17.5)
LYMPHOCYTES NFR BLD AUTO: 30.1 % (ref 24–44)
MCH RBC QN AUTO: 32.4 PG (ref 27–33)
MCHC RBC AUTO-ENTMCNC: 32.4 G/DL (ref 32–36.5)
MCV RBC AUTO: 100.1 FL (ref 80–96)
NEUTROPHILS # BLD AUTO: 1.5 10^3/UL (ref 1.8–7.7)
NEUTROPHILS NFR BLD AUTO: 46 % (ref 36–66)
PLATELET # BLD AUTO: 137 10^3/UL (ref 150–450)
RBC # BLD AUTO: 2.84 10^6/UL (ref 4.3–6.1)
WBC # BLD AUTO: 3.2 10^3/UL (ref 4–10)

## 2019-05-28 VITALS — SYSTOLIC BLOOD PRESSURE: 121 MMHG | DIASTOLIC BLOOD PRESSURE: 67 MMHG

## 2019-05-28 LAB
HCT VFR BLD AUTO: 27.4 % (ref 42–52)
HGB BLD-MCNC: 8.6 G/DL (ref 13.5–17.5)
LYMPHOCYTES NFR BLD AUTO: 29.4 % (ref 24–44)
MCH RBC QN AUTO: 32.1 PG (ref 27–33)
MCHC RBC AUTO-ENTMCNC: 31.4 G/DL (ref 32–36.5)
MCV RBC AUTO: 102.1 FL (ref 80–96)
NEUTROPHILS # BLD AUTO: 1.4 10^3/UL (ref 1.8–7.7)
NEUTROPHILS NFR BLD AUTO: 47.1 % (ref 36–66)
PLATELET # BLD AUTO: 136 10^3/UL (ref 150–450)
RBC # BLD AUTO: 2.68 10^6/UL (ref 4.3–6.1)
WBC # BLD AUTO: 2.9 10^3/UL (ref 4–10)

## 2019-06-01 ENCOUNTER — HOSPITAL ENCOUNTER (INPATIENT)
Dept: HOSPITAL 53 - M ED | Age: 84
LOS: 4 days | Discharge: HOME | DRG: 291 | End: 2019-06-05
Attending: INTERNAL MEDICINE | Admitting: INTERNAL MEDICINE
Payer: MEDICARE

## 2019-06-01 VITALS — DIASTOLIC BLOOD PRESSURE: 63 MMHG | SYSTOLIC BLOOD PRESSURE: 107 MMHG

## 2019-06-01 VITALS — HEIGHT: 72 IN | BODY MASS INDEX: 25.26 KG/M2 | WEIGHT: 186.51 LBS

## 2019-06-01 DIAGNOSIS — I48.91: ICD-10-CM

## 2019-06-01 DIAGNOSIS — Z85.828: ICD-10-CM

## 2019-06-01 DIAGNOSIS — E03.9: ICD-10-CM

## 2019-06-01 DIAGNOSIS — N18.3: ICD-10-CM

## 2019-06-01 DIAGNOSIS — R53.1: ICD-10-CM

## 2019-06-01 DIAGNOSIS — R26.81: ICD-10-CM

## 2019-06-01 DIAGNOSIS — Z79.899: ICD-10-CM

## 2019-06-01 DIAGNOSIS — I50.43: ICD-10-CM

## 2019-06-01 DIAGNOSIS — Z86.010: ICD-10-CM

## 2019-06-01 DIAGNOSIS — K64.8: ICD-10-CM

## 2019-06-01 DIAGNOSIS — Z79.01: ICD-10-CM

## 2019-06-01 DIAGNOSIS — D64.9: ICD-10-CM

## 2019-06-01 DIAGNOSIS — E78.5: ICD-10-CM

## 2019-06-01 DIAGNOSIS — K57.90: ICD-10-CM

## 2019-06-01 DIAGNOSIS — I13.0: Primary | ICD-10-CM

## 2019-06-01 DIAGNOSIS — I08.0: ICD-10-CM

## 2019-06-01 DIAGNOSIS — R51: ICD-10-CM

## 2019-06-01 LAB
ALBUMIN SERPL BCG-MCNC: 3.6 GM/DL (ref 3.2–5.2)
ALT SERPL W P-5'-P-CCNC: 21 U/L (ref 12–78)
BASE EXCESS BLDA CALC-SCNC: 1.4 MMOL/L (ref -2–2)
BASOPHILS # BLD AUTO: 0 10^3/UL (ref 0–0.2)
BASOPHILS NFR BLD AUTO: 0.5 % (ref 0–1)
BILIRUB CONJ SERPL-MCNC: 0.3 MG/DL (ref 0–0.2)
BILIRUB SERPL-MCNC: 0.8 MG/DL (ref 0.2–1)
BUN SERPL-MCNC: 27 MG/DL (ref 7–18)
CALCIUM SERPL-MCNC: 8.3 MG/DL (ref 8.8–10.2)
CHLORIDE SERPL-SCNC: 106 MEQ/L (ref 98–107)
CK MB CFR.DF SERPL CALC: 3.36
CK MB SERPL-MCNC: 4 NG/ML (ref ?–3.6)
CK SERPL-CCNC: 122 U/L (ref 39–308)
CO2 BLDA CALC-SCNC: 26.1 MEQ/L (ref 23–31)
CO2 SERPL-SCNC: 29 MEQ/L (ref 21–32)
CREAT SERPL-MCNC: 1.65 MG/DL (ref 0.7–1.3)
EOSINOPHIL # BLD AUTO: 0 10^3/UL (ref 0–0.5)
EOSINOPHIL NFR BLD AUTO: 0.3 % (ref 0–3)
GFR SERPL CREATININE-BSD FRML MDRD: 42.3 ML/MIN/{1.73_M2} (ref 35–?)
GLUCOSE SERPL-MCNC: 128 MG/DL (ref 70–100)
HCO3 BLDA-SCNC: 25 MEQ/L (ref 22–26)
HCO3 STD BLDA-SCNC: 25.6 MEQ/L (ref 22–26)
HCT VFR BLD AUTO: 26.4 % (ref 42–52)
HGB BLD-MCNC: 8.2 G/DL (ref 13.5–17.5)
LYMPHOCYTES # BLD AUTO: 0.5 10^3/UL (ref 1.5–4.5)
LYMPHOCYTES NFR BLD AUTO: 12 % (ref 24–44)
MCH RBC QN AUTO: 33.3 PG (ref 27–33)
MCHC RBC AUTO-ENTMCNC: 31.1 G/DL (ref 32–36.5)
MCV RBC AUTO: 107.3 FL (ref 80–96)
MONOCYTES # BLD AUTO: 1.1 10^3/UL (ref 0–0.8)
MONOCYTES NFR BLD AUTO: 28.8 % (ref 0–5)
NEUTROPHILS # BLD AUTO: 2.3 10^3/UL (ref 1.8–7.7)
NEUTROPHILS NFR BLD AUTO: 57.9 % (ref 36–66)
PCO2 BLDA: 35.5 MMHG (ref 35–45)
PH BLDA: 7.47 UNITS (ref 7.35–7.45)
PLATELET # BLD AUTO: 125 10^3/UL (ref 150–450)
PO2 BLDA: 68.1 MMHG (ref 75–100)
POTASSIUM SERPL-SCNC: 4.1 MEQ/L (ref 3.5–5.1)
PROT SERPL-MCNC: 6.8 GM/DL (ref 6.4–8.2)
RBC # BLD AUTO: 2.46 10^6/UL (ref 4.3–6.1)
SAO2 % BLDA: 92.7 % (ref 95–99)
SODIUM SERPL-SCNC: 142 MEQ/L (ref 136–145)
T4 SERPL-MCNC: 8.5 UG/DL (ref 4.5–12)
TROPONIN I SERPL-MCNC: 0.05 NG/ML (ref ?–0.1)
TSH SERPL DL<=0.005 MIU/L-ACNC: 7.33 UIU/ML (ref 0.36–3.74)
WBC # BLD AUTO: 3.9 10^3/UL (ref 4–10)

## 2019-06-01 RX ADMIN — FERROUS SULFATE TAB 325 MG (65 MG ELEMENTAL FE) SCH MG: 325 (65 FE) TAB at 20:06

## 2019-06-01 RX ADMIN — DOCUSATE SODIUM SCH MG: 100 CAPSULE, LIQUID FILLED ORAL at 20:06

## 2019-06-01 RX ADMIN — METOPROLOL SUCCINATE SCH MG: 25 TABLET, EXTENDED RELEASE ORAL at 08:55

## 2019-06-01 RX ADMIN — ATORVASTATIN CALCIUM SCH MG: 10 TABLET, FILM COATED ORAL at 08:55

## 2019-06-01 RX ADMIN — GABAPENTIN SCH MG: 100 CAPSULE ORAL at 08:55

## 2019-06-01 RX ADMIN — RIVAROXABAN SCH MG: 15 TABLET, FILM COATED ORAL at 18:02

## 2019-06-01 RX ADMIN — FERROUS SULFATE TAB 325 MG (65 MG ELEMENTAL FE) SCH MG: 325 (65 FE) TAB at 08:55

## 2019-06-01 RX ADMIN — GABAPENTIN SCH MG: 100 CAPSULE ORAL at 20:06

## 2019-06-01 RX ADMIN — LEVOTHYROXINE SODIUM SCH MCG: 50 TABLET ORAL at 07:05

## 2019-06-01 RX ADMIN — FUROSEMIDE SCH MG: 10 INJECTION, SOLUTION INTRAMUSCULAR; INTRAVENOUS at 18:02

## 2019-06-01 NOTE — REP
Clinical:  Cough and dyspnea .

 

Comparison:  11/12/2017 .

 

Findings:

The mediastinum and cardiac silhouette are stable and within normal limits for

portable technique.  The lung fields are clear without acute consolidation,

effusion, or pneumothorax.  Skeletal structures are intact.

 

Impression:

No acute cardiopulmonary process appreciated.

 

 

Electronically Signed by

Shorty Joseph MD 06/01/2019 07:11 A

## 2019-06-01 NOTE — ECGEPIP
[facility] - ED

                                       

                                       Test Date:    2019

Pat Name:     KATYA TAI          Department:   

Patient ID:   [id number]                 Room:         [place] [id number]

Gender:       Male                     Technician:   

:          [id number]               Requested By: KORI LLAMAS 

Order Number: [id number]     Reading MD:   Ancelmo Arriola

                                 Measurements

Intervals                              Axis          

Rate:         97                       P:            

WA:           -1                       QRS:          

QRSD:         126                      T:            102

QT:           379                                    

QTc:          482                                    

                           Interpretive Statements

ATRIAL FIBRILLATION WITH ABERRANT CONDUCTION OR VENTRICULAR PREMATURE

COMPLEXES

MODERATE INTRAVENTRICULAR CONDUCTION DELAY

MODERATE VOLTAGE CRITERIA FOR LVH, CONSIDER NORMAL VARIANT

NONSPECIFIC ST & T-WAVE ABNORMALITY

SIMILAR TO 17

Electronically Signed on 2019 10:31:26 EDT by Ancelmo Arriola

## 2019-06-01 NOTE — HPEPDOC
General


Date of Admission


Jun 1, 2019 at 06:14


Date of Service:  Jun 1, 2019


Chief Complaint


The patient is a 86-year-old male admitted with a reason for visit of Systolic 

And Diastolic Chf.


Source:  Patient, Family, RN/MD, Old records





History of Present Illness


Mr. Willett is an 85 years old man with Systolic CHF, severe MR, moderate AI 

(as per Echo 2017) and MDS. He presents with generalized symptoms for about 3 

days: SOB, orthopnea, generalized weakness, unable to walk, increasing leg edema

and headache. Denies any other symptoms.





In the ER, pt was noted to have pulmonary vascular congestion in CXR, with 

elevated pro-BNP 7191 (no old record to compare). Trop was negative. CBC was 

pretty much baseline.





Home Medications


Scheduled


Ascorbic Acid (Vitamin C) 500 Mg Tablet, 500 MG PO DAILY, (Reported)


Atorvastatin Calcium (Atorvastatin Calcium) 10 Mg Tab, 10 MG PO DAILY, (Report

ed)


   TAKES AT NOON 


Diphenhydramine HCl (Benadryl) 25 Mg Capsule, 25 MG PO QHS, (Reported)


Docusate Sodium (Colace) 100 Mg Capsule, 100 MG PO QHS, (Reported)


Furosemide (Furosemide) 40 Mg Tab, 40 MG PO DAILY, (Reported)


Gabapentin (Gabapentin) 100 Mg Cap, 100 MG PO BID, (Reported)


Iron Polysaccharide Complex (Ferrex 150) 150 Mg Capsule, 150 MG PO BID, 

(Reported)


Levothyroxine Sodium (Synthroid) 50 Mcg Tab, 50 MCG PO DAILY, (Reported)


Metoprolol Succinate (Toprol Xl) 25 Mg Tab, 25 MG PO DAILY, (Reported)


Multivitamins (Thera M Plus Tablet) 1 Tab Tab, 1 TAB PO DAILY, (Reported)


Rivaroxaban (Xarelto) 15 Mg Tab, 15 MG PO QPM, (Reported)


Vit C/E/Zn/Coppr/Lutein/Zeaxan (Preservision Areds 2 Softgel) 1 Each Capsule, 1 

EACH PO BID, (Reported)





Scheduled PRN


Acetaminophen/Diphenhydramine (Acetaminophen Pm Caplet) 1 Each Tablet, 1 TAB PO 

QHS PRN for INSOMNIA, (Reported)


Albuterol Sulfate (Proair Respiclick) 108 Mcg/Act Aer, 2 PUFFS INH Q4H PRN for 

SHORTNESS OF BREATH, (Reported)





Allergies


Coded Allergies:  


     No Known Allergies (Unverified , 11/2/03)





Past Medical History


Medical History


1.  Chronic anemia.  No history of blood loss.


2.  Atrial fibrillation.


3.  Hypertension.


4.  Hyperlipidemia.


5.  Hypothyroid.


6.  Basal cell cancer of the skin.


7.  Squamous cell cancer of the skin.


8.  Diverticulosis of the colon.


9.  Internal hemorrhoids.


10.  Tubular adenoma of the colon.


11.  Chronic kidney disease (CKD) stage III.


12.  Congestive heart failure, systolic with ejection fraction (EF) of 45-50%.


13. Moderate Aortic regurgitation.


14. Severe Mitral regurgitation.


15.  Diastolic dysfunction.





Family History


Significant Family History:  No pertinent family hx





Social History


* Smoker:  former Smoker


Alcohol:  Denies


Drugs:  denies





A-FIB/CHADSVASC


A-FIB History


Current/History of A-Fib/PAF?:  Yes


Current PO Anticoag Therapy:  Yes





Review of Systems


Constitutional:  Reports: Weakness, Fatigue; 


   Denies: Chills, Fever


Eyes:  Denies: Pain


ENT:  Reports: Head Aches; 


   Denies: Ear Pain


Skin:  Denies: Rash, Lesions


Pulmonary:  Reports: Dyspnea; 


   Denies: Cough


Cardiovascular:  Reports: Orthopnea, Edema; 


   Denies: Chest Pain, Palpitations, Lt Headedness


Gastrointestinal:  Denies: Nausea, Vomiting


Musculoskeletal:  Denies: Neck Pain, Back Pain


Neurological:  Reports: Weakness; 


   Denies: Numbness, Change in speech, Confusion


Psych:  Reports: Mood Normal, Anxiety





Physical Examination


General Exam:  Positive: Alert, Cooperative, No Acute Distress


Eye Exam:  Positive: PERRLA


ENT Exam:  Positive: Atraumatic


Neck Exam:  Positive: Supple; 


   Negative: JVD


Chest Exam:  Positive: Clear to auscultation, Normal air movement


Heart Exam:  Positive: Rate Normal, Regular Rhythm, Murmurs


Abdomen Exam:  Positive: Normal bowel sounds, Soft; 


   Negative: Tenderness


Extremity Exam:  Positive: Edema, Normal pulses


Skin Exam:  Positive: Nl turgor and temperature; 


   Negative: Rash, Breakdown


Neuro Exam:  Positive: Normal Speech, Strength at 5/5 X4 ext, Normal Tone


Psych Exam:  Positive: Mental status NL, Mood NL





Vital Signs





Vital Signs








  Date Time  Temp Pulse Resp B/P (MAP) Pulse Ox O2 Delivery O2 Flow Rate FiO2


 


6/1/19 06:00  111 20 136/71 (92) 98 Room Air  


 


6/1/19 04:05 98.1       











Laboratory Data


Labs 24H


Laboratory Tests 2


6/1/19 04:44: 


Immature Granulocyte % (Auto) 0.5, White Blood Count 3.9L, Red Blood Count 

2.46L, Hemoglobin 8.2L, Hematocrit 26.4L, Mean Corpuscular Volume 107.3H, Mean 

Corpuscular Hemoglobin 33.3H, Mean Corpuscular Hemoglobin Concent 31.1L, Red 

Cell Distribution Width 18.6H, Platelet Count 125L, Neutrophils (%) (Auto) 57.9,

Lymphocytes (%) (Auto) 12.0L, Monocytes (%) (Auto) 28.8H, Eosinophils (%) (Auto)

0.3, Basophils (%) (Auto) 0.5, Neutrophils # (Auto) 2.3, Lymphocytes # (Auto) 

0.5L, Monocytes # (Auto) 1.1H, Eosinophils # (Auto) 0.0, Basophils # (Auto) 0.0,

Nucleated Red Blood Cells % (auto) 0.0, Anion Gap 7L, Glomerular Filtration Rate

42.3, Calcium Level 8.3L, Aspartate Amino Transf (AST/SGOT) 13, Alanine 

Aminotransferase (ALT/SGPT) 21, Alkaline Phosphatase 88, Total Bilirubin 0.8, 

Direct Bilirubin 0.3H, Total Creatine Kinase 122, Creatine Kinase MB 4.0H, 

Creatine Kinase MB Relative Index 3.36, Troponin I 0.05, NT-Pro-B-Type Natriure

tic Peptide 7191H, Total Protein 6.8, Albumin 3.6, Albumin/Globulin Ratio 1.13, 

Thyroid Stimulating Hormone (TSH) 7.330H, Thyroxine (T4) 8.5


6/1/19 05:30: 


Blood Gas Bicarbonate Standard 25.6, Arterial Blood pH 7.466H, Arterial Blood 

Partial Pressure CO2 35.5, Arterial Blood Partial Pressure O2 68.1L, Arterial 

Blood Total CO2 26.1, Arterial Blood HCO3 25.0, Arterial Blood Base Excess 1.4, 

Arterial Blood Oxygen Saturation 92.7L


CBC/BMP


Laboratory Tests


6/1/19 04:44








Red Blood Count 2.46 L, Mean Corpuscular Volume 107.3 H, Mean Corpuscular 

Hemoglobin 33.3 H, Mean Corpuscular Hemoglobin Concent 31.1 L, Red Cell 

Distribution Width 18.6 H, Neutrophils (%) (Auto) 57.9, Lymphocytes (%) (Auto) 

12.0 L, Monocytes (%) (Auto) 28.8 H, Eosinophils (%) (Auto) 0.3, Basophils (%) 

(Auto) 0.5, Neutrophils # (Auto) 2.3, Lymphocytes # (Auto) 0.5 L, Monocytes # 

(Auto) 1.1 H, Eosinophils # (Auto) 0.0, Basophils # (Auto) 0.0





 Assessment/Plan








Acute on Chronic Systolic CHF with underlying Severe MR and Moderate AI


- Admit to inpatient


- IV Lasix and other home cardiac meds


- Tele, trend troponins


- I/O, daily wt


- Echo





Continue home meds for other chronic illness.





Plan / VTE


VTE Prophylaxis Ordered?:  No


VTE Exclusion Mechanical Proph:  Other


VTE Exclusion Pharmacological:  Other





Plan


Anticipated Discharge:  Home











LAQUITA ROJAS MD                   Jun 1, 2019 06:35

## 2019-06-01 NOTE — REP
Clinical:  Headache.

 

Comparison:  04/10/2017 .

 

Findings:

Atrophy with periventricular leukomalacia and microvascular ischemic changes are

appreciated.  The ventricles and sulci are symmetric.  Gray-white differentiation

is maintained.  There is no evidence for acute intracranial hemorrhage, mass/mass

effect, pathology or infarction.  No extra-axial fluid collection.  Calvarium is

intact.  Paranasal sinuses and mastoid air cells are clear.

 

Impression:

Atrophy and microvascular ischemic changes.

No acute intracranial hemorrhage, infarction, or mass/mass effect.

 

 

Electronically Signed by

Shorty Joseph MD 06/01/2019 04:10 P

## 2019-06-01 NOTE — IPNPDOC
Subjective


Date Seen


The patient was seen on 6/1/19.





Subjective


Chief Complaint/HPI


Shortness of breath, increasing lower extremity swelling,  Systolic CHF.


Events since last encounter


The patient reports the lower except the swelling appears to be improving. He 

still has problems with laying downunable to lay down in the bed, also reports 

some slight coughthis is improving as well. Denies any associated chest pain. 

Reports lack of appetite over the last 2-3 days. No fevers. No diarrhea. Reports

he has been on iron and his stools were a little darkreports he had sent off 

the fecal occult blood testing cards and apparently one of them had shown a 

little blood, however also tells me that on his previous colonoscopy with Dr. Muniz, he had advised him that at his age, he would not be requiring any 

more colonoscopies. Denies any abdominal pain. Denies any dysuria. Usually 

ambulate with a cane. Also reports headache ongoing for the last 3 daysreports 

it was bad enough that he had woken up his wife in the middle of the night 

secondary to the headache. No new numbness or weakness associated with the 

headache. Does report some word finding difficulty which is not newhe has had 

this for a while. Also reports some unsteadiness of gait when he ambulates with 

a cane but no falls.





Objective


Physical Examination


General Exam:  Positive: Alert, No Acute Distress, Other (sitting up at edge of 

bed)


Eye Exam:  Positive: PERRLA


ENT Exam:  Positive: Atraumatic


Chest Exam:  Positive: Normal air movement, Other (slightly diminished at lung 

base.); 


   Negative: Rales, Rhonchi, Wheezing


Heart Exam:  Positive: Tachycardic (slightly tachycardic), Irregular Rhythm, 

Murmurs (3/6 systolic murmur over apex)


Telemetry:  Positive: Atrial fibrillation


Abdomen Exam:  Positive: Soft; 


   Negative: Tenderness


Extremity Exam:  Positive: Edema (bilateral lower extremity 2+. Edema extending 

below knees.)


Skin Exam:  Negative: Rash


Neuro Exam:  Positive: Normal Speech, Strength at 5/5 X4 ext, Normal Tone, Other

([id number] appear intact. No pronator drift. Intact finger-to-nose test 

bilaterally. Strength 5 over 5 bilateral upper and lower extremities. Slight 

tremor is noted on examno resting tremor. Intact fine touch. Gait was not 

tested.)


Psych Exam:  Positive: Mental status NL, Mood NL





Assessment /Plan


Assessment


Acute on Chronic Systolic CHF with underlying Severe MR and Moderate AI


-Continue IV Lasix


-I's and O's


-Daily weights


-Echocardiogramlast year 45-50%


-Troponins negative thus far


-Patient advised regarding daily weight monitoring as well as 2 g salt 

restriction 


-Compression stockings for lower extremity edema


-Continue metoprolol. No ACE inhibitor as EF greater than 45% and CKD stage III 

with creatinine elevated at baseline


-Patient also reports, that he was previously deemed to not be a surgical 

candidate for his mitral valve/aortic valve disease in view of his multiple 

comorbidities





Atrial fibrillation:


-Continue Toprol and Xarelto


-Slightly tachycardicmonitor - if persistent, may need to increase metoprolol





Chronic kidney disease stage III:


-Monitor. At baseline.





Chronic anemia:


-Monitor H&H


-Outpatient follow-up


-Patient gets outpatient weekly H&H and has received blood transfusion 

previously and is also on iron supplements





Hypertension:


-Continue metoprolol





Hypothyroidism:


-Continue levothyroxine





Hyperlipidemia:


-Atorvastatin





Generalized weakness, unsteady gait:


-PT/OT evaluation





DVT prophylaxis:


-Already on Xarelto





Plan/VTE


VTE Prophylaxis Ordered?:  No


VTE Exclusion Mechanical Proph:  Other


VTE Exclusion Pharmacological:  Other





Plan


Anticipated Discharge:  Home





VS, I&O, 24H, [facility]


Vital Signs/I&O





Vital Signs








  Date Time  Temp Pulse Resp B/P (MAP) Pulse Ox O2 Delivery O2 Flow Rate FiO2


 


6/1/19 14:00 97.7 68 17  98   


 


6/1/19 13:30    145/70 (95)  Room Air  











Laboratory Data


24H LABS


Laboratory Tests 2


6/1/19 04:44: 


Immature Granulocyte % (Auto) 0.5, White Blood Count 3.9L, Red Blood Count 

2.46L, Hemoglobin 8.2L, Hematocrit 26.4L, Mean Corpuscular Volume 107.3H, Mean 

Corpuscular Hemoglobin 33.3H, Mean Corpuscular Hemoglobin Concent 31.1L, Red 

Cell Distribution Width 18.6H, Platelet Count 125L, Neutrophils (%) (Auto) 57.9,

Lymphocytes (%) (Auto) 12.0L, Monocytes (%) (Auto) 28.8H, Eosinophils (%) (Auto)

0.3, Basophils (%) (Auto) 0.5, Neutrophils # (Auto) 2.3, Lymphocytes # (Auto) 

0.5L, Monocytes # (Auto) 1.1H, Eosinophils # (Auto) 0.0, Basophils # (Auto) 0.0,

Nucleated Red Blood Cells % (auto) 0.0, Anion Gap 7L, Glomerular Filtration Rate

42.3, Calcium Level 8.3L, Aspartate Amino Transf (AST/SGOT) 13, Alanine A

minotransferase (ALT/SGPT) 21, Alkaline Phosphatase 88, Total Bilirubin 0.8, 

Direct Bilirubin 0.3H, Total Creatine Kinase 122, Creatine Kinase MB 4.0H, 

Creatine Kinase MB Relative Index 3.36, Troponin I 0.05, NT-Pro-B-Type 

Natriuretic Peptide 7191H, Total Protein 6.8, Albumin 3.6, Albumin/Globulin R

atio 1.13, Thyroid Stimulating Hormone (TSH) 7.330H, Thyroxine (T4) 8.5


6/1/19 05:30: 


Blood Gas Bicarbonate Standard 25.6, Arterial Blood pH 7.466H, Arterial Blood 

Partial Pressure CO2 35.5, Arterial Blood Partial Pressure O2 68.1L, Arterial 

Blood Total CO2 26.1, Arterial Blood HCO3 25.0, Arterial Blood Base Excess 1.4, 

Arterial Blood Oxygen Saturation 92.7L


6/1/19 08:24: Troponin I 0.07#


6/1/19 11:17: Troponin I 0.08


CBC/BMP


Laboratory Tests


6/1/19 04:44








Red Blood Count 2.46 L, Mean Corpuscular Volume 107.3 H, Mean Corpuscular 

Hemoglobin 33.3 H, Mean Corpuscular Hemoglobin Concent 31.1 L, Red Cell Distrib

ution Width 18.6 H, Neutrophils (%) (Auto) 57.9, Lymphocytes (%) (Auto) 12.0 L, 

Monocytes (%) (Auto) 28.8 H, Eosinophils (%) (Auto) 0.3, Basophils (%) (Auto) 

0.5, Neutrophils # (Auto) 2.3, Lymphocytes # (Auto) 0.5 L, Monocytes # (Auto) 

1.1 H, Eosinophils # (Auto) 0.0, Basophils # (Auto) 0.0











CARLITO JACKSON MD               Jun 1, 2019 15:51

## 2019-06-02 VITALS — SYSTOLIC BLOOD PRESSURE: 136 MMHG | DIASTOLIC BLOOD PRESSURE: 80 MMHG

## 2019-06-02 VITALS — DIASTOLIC BLOOD PRESSURE: 60 MMHG | SYSTOLIC BLOOD PRESSURE: 118 MMHG

## 2019-06-02 VITALS — SYSTOLIC BLOOD PRESSURE: 144 MMHG | DIASTOLIC BLOOD PRESSURE: 70 MMHG

## 2019-06-02 VITALS — DIASTOLIC BLOOD PRESSURE: 80 MMHG | SYSTOLIC BLOOD PRESSURE: 136 MMHG

## 2019-06-02 VITALS — DIASTOLIC BLOOD PRESSURE: 55 MMHG | SYSTOLIC BLOOD PRESSURE: 96 MMHG

## 2019-06-02 VITALS — SYSTOLIC BLOOD PRESSURE: 130 MMHG | DIASTOLIC BLOOD PRESSURE: 77 MMHG

## 2019-06-02 VITALS — SYSTOLIC BLOOD PRESSURE: 102 MMHG | DIASTOLIC BLOOD PRESSURE: 55 MMHG

## 2019-06-02 LAB
BASOPHILS # BLD AUTO: 0 10^3/UL (ref 0–0.2)
BASOPHILS NFR BLD AUTO: 0.5 % (ref 0–1)
BUN SERPL-MCNC: 28 MG/DL (ref 7–18)
CALCIUM SERPL-MCNC: 8.4 MG/DL (ref 8.8–10.2)
CHLORIDE SERPL-SCNC: 106 MEQ/L (ref 98–107)
CO2 SERPL-SCNC: 29 MEQ/L (ref 21–32)
CREAT SERPL-MCNC: 1.73 MG/DL (ref 0.7–1.3)
EOSINOPHIL # BLD AUTO: 0 10^3/UL (ref 0–0.5)
EOSINOPHIL NFR BLD AUTO: 0.5 % (ref 0–3)
GFR SERPL CREATININE-BSD FRML MDRD: 40.1 ML/MIN/{1.73_M2} (ref 35–?)
GLUCOSE SERPL-MCNC: 109 MG/DL (ref 70–100)
HCT VFR BLD AUTO: 25.5 % (ref 42–52)
HGB BLD-MCNC: 8 G/DL (ref 13.5–17.5)
LYMPHOCYTES # BLD AUTO: 0.6 10^3/UL (ref 1.5–4.5)
LYMPHOCYTES NFR BLD AUTO: 14.9 % (ref 24–44)
MAGNESIUM SERPL-MCNC: 2.5 MG/DL (ref 1.8–2.4)
MCH RBC QN AUTO: 32.7 PG (ref 27–33)
MCHC RBC AUTO-ENTMCNC: 31.4 G/DL (ref 32–36.5)
MCV RBC AUTO: 104.1 FL (ref 80–96)
MONOCYTES # BLD AUTO: 1.1 10^3/UL (ref 0–0.8)
MONOCYTES NFR BLD AUTO: 28.8 % (ref 0–5)
NEUTROPHILS # BLD AUTO: 2.1 10^3/UL (ref 1.8–7.7)
NEUTROPHILS NFR BLD AUTO: 54.8 % (ref 36–66)
PHOSPHATE SERPL-MCNC: 3.2 MG/DL (ref 2.5–4.9)
PLATELET # BLD AUTO: 118 10^3/UL (ref 150–450)
POTASSIUM SERPL-SCNC: 3.8 MEQ/L (ref 3.5–5.1)
RBC # BLD AUTO: 2.45 10^6/UL (ref 4.3–6.1)
SODIUM SERPL-SCNC: 143 MEQ/L (ref 136–145)
WBC # BLD AUTO: 3.9 10^3/UL (ref 4–10)

## 2019-06-02 RX ADMIN — FERROUS SULFATE TAB 325 MG (65 MG ELEMENTAL FE) SCH MG: 325 (65 FE) TAB at 21:37

## 2019-06-02 RX ADMIN — DOCUSATE SODIUM SCH MG: 100 CAPSULE, LIQUID FILLED ORAL at 21:37

## 2019-06-02 RX ADMIN — LEVOTHYROXINE SODIUM SCH MCG: 50 TABLET ORAL at 05:17

## 2019-06-02 RX ADMIN — RIVAROXABAN SCH MG: 15 TABLET, FILM COATED ORAL at 17:42

## 2019-06-02 RX ADMIN — GABAPENTIN SCH MG: 100 CAPSULE ORAL at 21:38

## 2019-06-02 RX ADMIN — GABAPENTIN SCH MG: 100 CAPSULE ORAL at 08:17

## 2019-06-02 RX ADMIN — FERROUS SULFATE TAB 325 MG (65 MG ELEMENTAL FE) SCH MG: 325 (65 FE) TAB at 08:17

## 2019-06-02 RX ADMIN — METOPROLOL SUCCINATE SCH MG: 25 TABLET, EXTENDED RELEASE ORAL at 09:00

## 2019-06-02 RX ADMIN — FUROSEMIDE SCH MG: 10 INJECTION, SOLUTION INTRAMUSCULAR; INTRAVENOUS at 08:16

## 2019-06-02 RX ADMIN — ATORVASTATIN CALCIUM SCH MG: 10 TABLET, FILM COATED ORAL at 08:17

## 2019-06-02 NOTE — IPNPDOC
Subjective


Date Seen


The patient was seen on 6/2/19.





Subjective


Chief Complaint/HPI


Shortness of breath, increasing lower extremity swelling, systolic CHF. 





The patient is a 84-year-old gentleman with a previous history of systolic 

congestive heart failure, severe mitral regurgitation and moderate aortic 

incompetence and myelodysplastic syndrome who presented to the ER with complains

of 3 days of shortness of breath, orthopnea, generalized weakness, difficulty 

ambulating, increasing slowly as 20 swelling as well as headaches. In the ER, x-

ray was consistent with pulmonary vascular congestion, BNP elevated at 7191, 

negative troponin. Patient was admitted to our facility for further management 

of his congestive heart failure.


Events since last encounter


The patient reports his shortness of breath seems improvedhe slept better last 

night although he still had to sleep propped up. Reports he still has some 

dyspnea on exertion. Still has some cough. No associated chest pain. The 

headaches seem to have improvednone today. Denies any chest pain. No overnight 

fevers/chills or sweats. No abdominal pain. Has been urinating quite a bit. 

Denies constipation. Ambulate with physical therapydid not feel unsteady





Objective


Physical Examination


General Exam:  Positive: Alert, No Acute Distress


Eye Exam:  Positive: PERRLA


ENT Exam:  Positive: Mucous membr. moist/pink


Chest Exam:  Positive: Normal air movement, Other (Diminished air entry at lung 

base. No crackles or wheeze)


Heart Exam:  Positive: Irregular Rhythm, Normal S1, Normal S2, Murmurs (3/6 

systolic murmur over apex)


Abdomen Exam:  Positive: Soft; 


   Negative: Tenderness


Extremity Exam:  Positive: Edema (Bilateral lower extremity 2+ edema extending 

below knees, a little improved from yesterday)


Skin Exam:  Negative: Rash


Neuro Exam:  Positive: Other (Awake, alert, oriented 3. Answering questions 

appropriately. Moving all 4 extremities.)


Psych Exam:  Positive: Mental status NL, Mood NL





Assessment /Plan


Assessment


Acute on Chronic Systolic CHF with underlying Severe MR and Moderate AI


-Continue IV Lasix - decreased dose to once daily as blood pressure was a little

low this morning.


-Ct I's and O's, Daily weights


-Echocardiogramlast year 45-50%. Echo ordered- pending


-Troponins negative x3


-Patient advised regarding daily weight monitoring as well as 2 g salt 

restriction and fluid restriction of 1.5 L


-Compression stockings for lower extremity edema


-Continue metoprolol. No ACE inhibitor as EF greater than 45% and CKD stage III 

with creatinine elevated at baseline


-Patient also reports, that he was previously deemed to not be a surgical 

candidate for his mitral valve/aortic valve disease in view of his multiple 

comorbidities





Atrial fibrillation:


-Continue Toprol and Xarelto





Chronic kidney disease stage III:


-Creatinine slightly higher1.73 today from 1.65 yesterday.


-Monitor - may need to transition to oral meds if continues to rise. Dose of IV 

Lasix was reduced to once daily.





Chronic anemia:


-Monitor H&H


-Outpatient follow-up


-Patient gets outpatient weekly H&H and has received blood transfusion 

previously and is also on iron supplements


-Hgb 8.0 today and asymptomaticno indication for blood transfusion at this 

time.





Hypertension:


-Continue metoprolol and Lasix with holding parameters





Hypothyroidism:


-Continue levothyroxine





Hyperlipidemia:


-Atorvastatin





Generalized weakness, unsteady gait:


-Did well with physical therapyhas been cleared for home discharge once 

medically stable.





Headaches:


-Head CT did not show any acute findings


-Resolved





DVT prophylaxis:


-Already on Xarelto





Disposition: Follow up on 2-D echocardiogram. Anticipate discharge home 

hopefully in the next 24-48 hours once adequately diuresed and able to 

transition to oral furosemide.





Plan/VTE


VTE Prophylaxis Ordered?:  No


VTE Exclusion Mechanical Proph:  Other (On Xarelto)


VTE Exclusion Pharmacological:  Other (On Xarelto)





Plan


Anticipated Discharge:  Home





VS, I&O, 24H, [facility]


Vital Signs/I&O





Vital Signs








  Date Time  Temp Pulse Resp B/P (MAP) Pulse Ox O2 Delivery O2 Flow Rate FiO2


 


6/2/19 14:00 97.3 92 18  99   


 


6/2/19 09:00    96/55    


 


6/1/19 13:30      Room Air  











l


I&O- Last 24 Hours up to 6 AM 


 


 6/2/19





 06:00


 


Intake Total 600 ml


 


Output Total 525 ml


 


Balance 75 ml











Laboratory Data


24H LABS


Laboratory Tests 2


6/2/19 05:16: 


Immature Granulocyte % (Auto) 0.5, White Blood Count 3.9L, Red Blood Count 

2.45L, Hemoglobin 8.0L, Hematocrit 25.5L, Mean Corpuscular Volume 104.1H, Mean 

Corpuscular Hemoglobin 32.7, Mean Corpuscular Hemoglobin Concent 31.4L, Red Cell

Distribution Width 18.7H, Platelet Count 118L, Neutrophils (%) (Auto) 54.8, 

Lymphocytes (%) (Auto) 14.9L, Monocytes (%) (Auto) 28.8H, Eosinophils (%) (Auto)

0.5, Basophils (%) (Auto) 0.5, Neutrophils # (Auto) 2.1, Lymphocytes # (Auto) 

0.6L, Monocytes # (Auto) 1.1H, Eosinophils # (Auto) 0.0, Basophils # (Auto) 0.0,

Nucleated Red Blood Cells % (auto) 0.0, Anion Gap 8, Glomerular Filtration Rate 

40.1, Blood Urea Nitrogen 28H, Creatinine 1.73H, Sodium Level 143, Potassium 

Level 3.8, Chloride Level 106, Carbon Dioxide Level 29, Calcium Level 8.4L, 

Phosphorus Level 3.2, Magnesium Level 2.5H


CBC/BMP


Laboratory Tests


6/2/19 05:16








Red Blood Count 2.45 L, Mean Corpuscular Volume 104.1 H, Mean Corpuscular 

Hemoglobin 32.7, Mean Corpuscular Hemoglobin Concent 31.4 L, Red Cell 

Distribution Width 18.7 H, Neutrophils (%) (Auto) 54.8, Lymphocytes (%) (Auto) 

14.9 L, Monocytes (%) (Auto) 28.8 H, Eosinophils (%) (Auto) 0.5, Basophils (%) 

(Auto) 0.5, Neutrophils # (Auto) 2.1, Lymphocytes # (Auto) 0.6 L, Monocytes # 

(Auto) 1.1 H, Eosinophils # (Auto) 0.0, Basophils # (Auto) 0.0, Calcium Level 

8.4 L











CARLITO JACKSON MD               Jun 2, 2019 16:23

## 2019-06-03 VITALS — DIASTOLIC BLOOD PRESSURE: 59 MMHG | SYSTOLIC BLOOD PRESSURE: 141 MMHG

## 2019-06-03 VITALS — SYSTOLIC BLOOD PRESSURE: 123 MMHG | DIASTOLIC BLOOD PRESSURE: 79 MMHG

## 2019-06-03 VITALS — DIASTOLIC BLOOD PRESSURE: 70 MMHG | SYSTOLIC BLOOD PRESSURE: 126 MMHG

## 2019-06-03 LAB
BASOPHILS # BLD AUTO: 0 10^3/UL (ref 0–0.2)
BASOPHILS NFR BLD AUTO: 0.7 % (ref 0–1)
BUN SERPL-MCNC: 24 MG/DL (ref 7–18)
CALCIUM SERPL-MCNC: 8.7 MG/DL (ref 8.8–10.2)
CHLORIDE SERPL-SCNC: 108 MEQ/L (ref 98–107)
CO2 SERPL-SCNC: 28 MEQ/L (ref 21–32)
CREAT SERPL-MCNC: 1.52 MG/DL (ref 0.7–1.3)
EOSINOPHIL # BLD AUTO: 0 10^3/UL (ref 0–0.5)
EOSINOPHIL NFR BLD AUTO: 1 % (ref 0–3)
GFR SERPL CREATININE-BSD FRML MDRD: 46.5 ML/MIN/{1.73_M2} (ref 35–?)
GLUCOSE SERPL-MCNC: 122 MG/DL (ref 70–100)
HCT VFR BLD AUTO: 25.5 % (ref 42–52)
HGB BLD-MCNC: 7.9 G/DL (ref 13.5–17.5)
LYMPHOCYTES # BLD AUTO: 0.5 10^3/UL (ref 1.5–4.5)
LYMPHOCYTES NFR BLD AUTO: 15 % (ref 24–44)
MAGNESIUM SERPL-MCNC: 2.3 MG/DL (ref 1.8–2.4)
MCH RBC QN AUTO: 32.2 PG (ref 27–33)
MCHC RBC AUTO-ENTMCNC: 31 G/DL (ref 32–36.5)
MCV RBC AUTO: 104.1 FL (ref 80–96)
MONOCYTES # BLD AUTO: 1 10^3/UL (ref 0–0.8)
MONOCYTES NFR BLD AUTO: 32.6 % (ref 0–5)
NEUTROPHILS # BLD AUTO: 1.6 10^3/UL (ref 1.8–7.7)
NEUTROPHILS NFR BLD AUTO: 50.4 % (ref 36–66)
PHOSPHATE SERPL-MCNC: 2.8 MG/DL (ref 2.5–4.9)
PLATELET # BLD AUTO: 111 10^3/UL (ref 150–450)
POTASSIUM SERPL-SCNC: 3.7 MEQ/L (ref 3.5–5.1)
RBC # BLD AUTO: 2.45 10^6/UL (ref 4.3–6.1)
SODIUM SERPL-SCNC: 144 MEQ/L (ref 136–145)
TROPONIN I SERPL-MCNC: 0.04 NG/ML (ref ?–0.1)
WBC # BLD AUTO: 3.1 10^3/UL (ref 4–10)

## 2019-06-03 RX ADMIN — ATORVASTATIN CALCIUM SCH MG: 10 TABLET, FILM COATED ORAL at 09:03

## 2019-06-03 RX ADMIN — LEVOTHYROXINE SODIUM SCH MCG: 50 TABLET ORAL at 05:18

## 2019-06-03 RX ADMIN — RIVAROXABAN SCH MG: 15 TABLET, FILM COATED ORAL at 17:18

## 2019-06-03 RX ADMIN — DOCUSATE SODIUM SCH MG: 100 CAPSULE, LIQUID FILLED ORAL at 20:04

## 2019-06-03 RX ADMIN — GABAPENTIN SCH MG: 100 CAPSULE ORAL at 20:04

## 2019-06-03 RX ADMIN — FERROUS SULFATE TAB 325 MG (65 MG ELEMENTAL FE) SCH MG: 325 (65 FE) TAB at 20:04

## 2019-06-03 RX ADMIN — GABAPENTIN SCH MG: 100 CAPSULE ORAL at 09:03

## 2019-06-03 RX ADMIN — FERROUS SULFATE TAB 325 MG (65 MG ELEMENTAL FE) SCH MG: 325 (65 FE) TAB at 09:03

## 2019-06-03 RX ADMIN — METOPROLOL SUCCINATE SCH MG: 25 TABLET, EXTENDED RELEASE ORAL at 09:03

## 2019-06-03 NOTE — ECGEPIP
[facility]

                                       

                                       Test Date:    2019

Pat Name:     KATYA TAI          Department:   

Patient ID:   [id number]                 Room:         [place] [id number]

Gender:       Male                     Technician:   

:          [id number]               Requested By: CARLITO MONTAGUE

Order Number: [id number]     Reading MD:   Eddy Kim

                                 Measurements

Intervals                              Axis          

Rate:         91                       P:            

WA:           -1                       QRS:          

QRSD:         129                      T:            107

QT:           394                                    

QTc:          486                                    

                           Interpretive Statements

Underlying atrial fibrillation with controlled ventricular response.

Single wide complex beat suspected PVC

Somewhat prominent voltage with strain pattern in keeping with LVH.

No change from 19.

Electronically Signed on 6-3-2019 18:33:49 EDT by Eddy Kim

## 2019-06-03 NOTE — IPNPDOC
Text Note


Date of Service


The patient was seen on 6/3/19.





NOTE


Subjective:


Patient is an 84-year-old  male with past medical history of systolic 

CHF, severe MR, moderate AR and mild dysplastic syndrome who presented to the 

emergency room with complaints of shortness of breath, progressive over 3 days 

associated with orthopnea weakness and lower extremity swelling. In emergency 

room, patient was suspected of having fluid overload and was admitted to the 

hospital service for further evaluation and treatment.





Patient was seen and examined at the bedside. Currently, patient reports that 

his breathing is doing better. He reports that his lower extremity swelling has 

improved significantly. He denies any chest pain, shortness of breath or 

palpitations. Denies nausea, vomiting, abdominal pain, constipation, diarrhea, 

or urinary discomfort.





Objective:


Vitals (See below)


General: Lying in bed, no acute distress, comfortable, AAOx3


HEENT: NC, AT


CVS: RRR, +S1S2


Lungs: Fair air entry b/l, there does not appear to be auscultated wheezing, 

rhonchi or rales


Abdomen: Soft, ND, NT


Extremities: Lower extremities do not reveal any significant edema, - Calf 

tenderness





Assessment and plan:


Acute on Chronic Systolic CHF with underlying Severe MR and Moderate AI


- Clinically patient has had improvement in his symptoms


- Physical does not reveal any significant signs of fluid overload


- BNP was noted to be significantly elevated on admission


- Troponin 3 negative


- CXR 6/1: No acute cardiopulmonary process appreciated.


- Echo complete, report pending


- Continue with strict ins and outs, daily weights, salt restrictions and fluid 

restriction 1500cc


- Continue with furosemide 40 IV daily; will give additional dose of 20 IV this 

afternoon


- Anticipate transition to oral diuretics tomorrow





Atrial fibrillation:


- Continue with rate control with metoprolol


- Continue full evaluation with Xarelto





Chronic kidney disease stage III:


- Creatinine baseline of approximately 1.4-1.7


- Creatinine appears to be better than baseline. Currently





Chronic anemia:


- Baseline hemoglobin of 9-10


- Hemoglobin currently is 7.9


- As an outpatient, he follows up with erythropoietin infusions 





Hypertension:


- Blood pressure appears well controlled


- Continue with metoprolol and furosemide with hold parameters





Hypothyroidism:


- Continue with levothyroxine





Hyperlipidemia:


- Continue with atorvastatin





Generalized weakness, unsteady gait:


- Has been evaluated by physical therapy and is been cleared for discharge home





Headaches:


- Currently does not report any headaches


- CT head 6/1: Atrophy and microvascular ischemic changes. No acute intracranial

hemorrhage, infarction, or mass/mass effect.





DVT prophylaxis


- c/w full anticoagulation with Xarelto 





Disposition: 


- Anticipate discharge home hopefully in the next 24-48 hours 


- Likely transition to PO diuretics tomorrow





VS,Fishbone, I+O


VS, Fishbone, I+O


Laboratory Tests


6/3/19 06:48








Red Blood Count 2.45 L, Mean Corpuscular Volume 104.1 H, Mean Corpuscular 

Hemoglobin 32.2, Mean Corpuscular Hemoglobin Concent 31.0 L, Red Cell Distr

ibution Width 18.6 H, Neutrophils (%) (Auto) 50.4, Lymphocytes (%) (Auto) 15.0 

L, Monocytes (%) (Auto) 32.6 H, Eosinophils (%) (Auto) 1.0, Basophils (%) (Auto)

0.7, Neutrophils # (Auto) 1.6 L, Lymphocytes # (Auto) 0.5 L, Monocytes # (Auto) 

1.0 H, Eosinophils # (Auto) 0.0, Basophils # (Auto) 0.0, Calcium Level 8.7 L








Vital Signs








  Date Time  Temp Pulse Resp B/P (MAP) Pulse Ox O2 Delivery O2 Flow Rate FiO2


 


6/3/19 09:03  109  123/79    


 


6/3/19 06:00 96.9  18  98   


 


6/1/19 13:30      Room Air  














I&O- Last 24 Hours up to 6 AM 


 


 6/3/19





 06:00


 


Intake Total 1000 ml


 


Output Total 900 ml


 


Balance 100 ml

















ISREAL MERINO MD                 Antoni 3, 2019 14:36

## 2019-06-04 VITALS — DIASTOLIC BLOOD PRESSURE: 63 MMHG | SYSTOLIC BLOOD PRESSURE: 118 MMHG

## 2019-06-04 VITALS — DIASTOLIC BLOOD PRESSURE: 70 MMHG | SYSTOLIC BLOOD PRESSURE: 146 MMHG

## 2019-06-04 LAB
BASOPHILS # BLD AUTO: 0 10^3/UL (ref 0–0.2)
BASOPHILS NFR BLD AUTO: 0.8 % (ref 0–1)
BUN SERPL-MCNC: 20 MG/DL (ref 7–18)
CALCIUM SERPL-MCNC: 8.8 MG/DL (ref 8.8–10.2)
CHLORIDE SERPL-SCNC: 105 MEQ/L (ref 98–107)
CO2 SERPL-SCNC: 28 MEQ/L (ref 21–32)
CREAT SERPL-MCNC: 1.58 MG/DL (ref 0.7–1.3)
EOSINOPHIL # BLD AUTO: 0.1 10^3/UL (ref 0–0.5)
EOSINOPHIL NFR BLD AUTO: 1.7 % (ref 0–3)
GFR SERPL CREATININE-BSD FRML MDRD: 44.5 ML/MIN/{1.73_M2} (ref 35–?)
GLUCOSE SERPL-MCNC: 135 MG/DL (ref 70–100)
HCT VFR BLD AUTO: 27 % (ref 42–52)
HGB BLD-MCNC: 8.5 G/DL (ref 13.5–17.5)
LYMPHOCYTES # BLD AUTO: 0.7 10^3/UL (ref 1.5–4.5)
LYMPHOCYTES NFR BLD AUTO: 18.4 % (ref 24–44)
MAGNESIUM SERPL-MCNC: 2.4 MG/DL (ref 1.8–2.4)
MCH RBC QN AUTO: 33.6 PG (ref 27–33)
MCHC RBC AUTO-ENTMCNC: 31.5 G/DL (ref 32–36.5)
MCV RBC AUTO: 106.7 FL (ref 80–96)
MONOCYTES # BLD AUTO: 1.1 10^3/UL (ref 0–0.8)
MONOCYTES NFR BLD AUTO: 29.6 % (ref 0–5)
NEUTROPHILS # BLD AUTO: 1.7 10^3/UL (ref 1.8–7.7)
NEUTROPHILS NFR BLD AUTO: 48.1 % (ref 36–66)
PLATELET # BLD AUTO: 121 10^3/UL (ref 150–450)
POTASSIUM SERPL-SCNC: 3.6 MEQ/L (ref 3.5–5.1)
RBC # BLD AUTO: 2.53 10^6/UL (ref 4.3–6.1)
SODIUM SERPL-SCNC: 140 MEQ/L (ref 136–145)
WBC # BLD AUTO: 3.6 10^3/UL (ref 4–10)

## 2019-06-04 RX ADMIN — ATORVASTATIN CALCIUM SCH MG: 10 TABLET, FILM COATED ORAL at 08:41

## 2019-06-04 RX ADMIN — ENALAPRIL MALEATE SCH MG: 10 TABLET ORAL at 21:09

## 2019-06-04 RX ADMIN — METOPROLOL SUCCINATE SCH MG: 25 TABLET, EXTENDED RELEASE ORAL at 08:41

## 2019-06-04 RX ADMIN — LEVOTHYROXINE SODIUM SCH MCG: 50 TABLET ORAL at 05:37

## 2019-06-04 RX ADMIN — TORSEMIDE SCH MG: 20 TABLET ORAL at 14:25

## 2019-06-04 RX ADMIN — ENALAPRIL MALEATE SCH MG: 10 TABLET ORAL at 12:06

## 2019-06-04 RX ADMIN — FERROUS SULFATE TAB 325 MG (65 MG ELEMENTAL FE) SCH MG: 325 (65 FE) TAB at 21:08

## 2019-06-04 RX ADMIN — FERROUS SULFATE TAB 325 MG (65 MG ELEMENTAL FE) SCH MG: 325 (65 FE) TAB at 08:42

## 2019-06-04 RX ADMIN — GABAPENTIN SCH MG: 100 CAPSULE ORAL at 08:42

## 2019-06-04 RX ADMIN — RIVAROXABAN SCH MG: 15 TABLET, FILM COATED ORAL at 17:25

## 2019-06-04 RX ADMIN — GABAPENTIN SCH MG: 100 CAPSULE ORAL at 21:08

## 2019-06-04 RX ADMIN — DOCUSATE SODIUM SCH MG: 100 CAPSULE, LIQUID FILLED ORAL at 21:08

## 2019-06-04 NOTE — ECHO
DATE OF PROCEDURE:  06/04/2019

 

INDICATION:  Congestive heart failure.

 

Height 183 cm.

Weight 83 kg.

 

DIMENSIONS:

IVS 1.5

LV 5.1

LVPW 1.2

LA 6.2

Aorta 4.0

Left atrial volume index 93 mL per meter square.

 

FINDINGS:

The study is of good technical quality.  The patient is in atrial fibrillation

with heart rate around 110 beats per minute.

 

Left ventricle is a normal size.  It is severely globally hypokinetic.  Estimated

left ventricular ejection fraction is around 20%.  Right ventricle does not

appear grossly enlarged.  Both atria are severely enlarged.  Aortic valve is

heavily sclerotic but it has three cusps and preserved mobility.  There are very

prominent degenerative abnormalities of mitral valve but overall visualization of

the valve was poor.  Tricuspid valve is normal.  Pulmonic valve also appears

normal.  No pericardial effusion is noted.  Inferior vena cava was not

visualized.  Aortic root appears normal.  Aortic arch and abdominal aorta were

not well seen.

 

Doppler interrogation of aortic valve reveals no stenosis and approximately

moderate insufficiency.  There is likely severe mitral insufficiency with

eccentric MR jet oriented towards lateral aspect of left atrium, PISA thickening.

The valve is borderline between moderately severe and severe.  Calculated

affective regurgitant opening was 0.4 cm2.  There is mild tricuspid

insufficiency.  Calculated pulmonary artery pressure is in 40s corresponding to

moderate pulmonary hypertension.

 

Evaluation of diastolic function is inconclusive due to underlying atrial

fibrillation.

 

CONCLUSION:

1.  Study is of good technical quality.

2.  Normal LV size with severe global hypokinesis.

3.  Moderate aortic insufficiency.

4.  Moderately severe or severe mitral insufficiency, possibly due to mitral

valve prolapse even though it was not visualized by 2-D imaging.

5.  Unable to estimate central venous pressure but at least moderate pulmonary

hypertension.

 

COMMENTS:

SBE prophylaxis is not recommended.

## 2019-06-04 NOTE — IPNPDOC
Text Note


Date of Service


The patient was seen on 6/4/19.





NOTE


Subjective:


Patient is an 84-year-old  male with past medical history of systolic 

CHF, severe MR, moderate AR and mild dysplastic syndrome who presented to the 

emergency room with complaints of shortness of breath, progressive over 3 days 

associated with orthopnea weakness and lower extremity swelling. In emergency 

room, patient was suspected of having fluid overload and was admitted to the 

hospital service for further evaluation and treatment.





Patient was seen and examined at the bedside. Currently he reports improvement 

in his breathing. Denies any CP, palpitations or cough. Reports improvement in 

LE swelling. Denies N/V, abdominal pain, C/D or dysuria. 





Objective:


Vitals (See below)


General: Lying in bed, no acute distress, comfortable, AAOx3


HEENT: NC, AT


CVS: RRR, +S1S2


Lungs: Fair air entry b/l, - rhonchi / rales / wheezing 


Abdomen: Soft, non-distended / non-tender


Extremities: LE are without any significant edema, - Calf tenderness





Assessment and plan:


Acute on Chronic Systolic CHF with underlying Severe MR and Moderate AI


- Clinically patient has had improvement in his symptoms


- Physical does not reveal any significant signs of fluid overload


- BNP was noted to be significantly elevated on admission


- Troponin 3 negative


- CXR 6/1: No acute cardiopulmonary process appreciated.


- ECHO 6/4/19: EF: 20%, Severe global hypokinesis, Moderately severe / severe 

MI, moderate pulmonary HTN


- Continue with strict ins and outs, daily weights, salt restrictions and fluid 

restriction 1500cc


- Will start Torsemide PO today; s/p Furosemide 


- Will start Enalapril; c/w Metoprolol 





Atrial fibrillation:


- Continue with rate control with metoprolol


- Continue full evaluation with Xarelto





Chronic kidney disease stage III:


- Creatinine baseline of approximately 1.4-1.7


- Creatinine appears to be better than baseline. Currently





Chronic anemia:


- Baseline hemoglobin of 9-10


- Hemoglobin currently is 7.9


- As an outpatient, he follows up with erythropoietin infusions 





Hypertension:


- Blood pressure appears well controlled


- Continue with metoprolol and furosemide with hold parameters


- Will add ACE-I





Hypothyroidism:


- Continue with levothyroxine





Hyperlipidemia:


- Continue with atorvastatin





Generalized weakness, unsteady gait:


- Has been evaluated by physical therapy and is been cleared for discharge home





Headaches:


- Currently does not report any headaches


- CT head 6/1: Atrophy and microvascular ischemic changes. No acute intracranial

hemorrhage, infarction, or mass/mass effect.





DVT prophylaxis


- c/w full anticoagulation with Xarelto 





Disposition: 


- Anticipate discharge home hopefully in the next 24 hours 


- Ensure adequate diuresis with oral medications





VS,Fishbone, I+O


VS, Fishbone, I+O


Laboratory Tests


6/4/19 07:54








Red Blood Count 2.53 L, Mean Corpuscular Volume 106.7 H, Mean Corpuscular 

Hemoglobin 33.6 H, Mean Corpuscular Hemoglobin Concent 31.5 L, Red Cell 

Distribution Width 18.8 H, Neutrophils (%) (Auto) 48.1, Lymphocytes (%) (Auto) 

18.4 L, Monocytes (%) (Auto) 29.6 H, Eosinophils (%) (Auto) 1.7, Basophils (%) 

(Auto) 0.8, Neutrophils # (Auto) 1.7 L, Lymphocytes # (Auto) 0.7 L, Monocytes # 

(Auto) 1.1 H, Eosinophils # (Auto) 0.1, Basophils # (Auto) 0.0, Calcium Level 

8.8








Vital Signs








  Date Time  Temp Pulse Resp B/P (MAP) Pulse Ox O2 Delivery O2 Flow Rate FiO2


 


6/4/19 08:41  83  123/79    


 


6/3/19 22:00 97.5  18  95   


 


6/1/19 13:30      Room Air  














I&O- Last 24 Hours up to 6 AM 


 


 6/4/19





 06:00


 


Intake Total 660 ml


 


Output Total 1225 ml


 


Balance -565 ml

















ISREAL MERINO MD                 Jun 4, 2019 10:43

## 2019-06-05 VITALS — SYSTOLIC BLOOD PRESSURE: 103 MMHG | DIASTOLIC BLOOD PRESSURE: 53 MMHG

## 2019-06-05 VITALS — DIASTOLIC BLOOD PRESSURE: 60 MMHG | SYSTOLIC BLOOD PRESSURE: 108 MMHG

## 2019-06-05 LAB
BASOPHILS # BLD AUTO: 0 10^3/UL (ref 0–0.2)
BASOPHILS NFR BLD AUTO: 0.6 % (ref 0–1)
BUN SERPL-MCNC: 22 MG/DL (ref 7–18)
CALCIUM SERPL-MCNC: 8 MG/DL (ref 8.8–10.2)
CHLORIDE SERPL-SCNC: 106 MEQ/L (ref 98–107)
CO2 SERPL-SCNC: 29 MEQ/L (ref 21–32)
CREAT SERPL-MCNC: 1.69 MG/DL (ref 0.7–1.3)
EOSINOPHIL # BLD AUTO: 0.1 10^3/UL (ref 0–0.5)
EOSINOPHIL NFR BLD AUTO: 1.7 % (ref 0–3)
GFR SERPL CREATININE-BSD FRML MDRD: 41.2 ML/MIN/{1.73_M2} (ref 35–?)
GLUCOSE SERPL-MCNC: 105 MG/DL (ref 70–100)
HCT VFR BLD AUTO: 25.8 % (ref 42–52)
HGB BLD-MCNC: 8.1 G/DL (ref 13.5–17.5)
LYMPHOCYTES # BLD AUTO: 0.6 10^3/UL (ref 1.5–4.5)
LYMPHOCYTES NFR BLD AUTO: 17 % (ref 24–44)
MAGNESIUM SERPL-MCNC: 2.4 MG/DL (ref 1.8–2.4)
MCH RBC QN AUTO: 32.4 PG (ref 27–33)
MCHC RBC AUTO-ENTMCNC: 31.4 G/DL (ref 32–36.5)
MCV RBC AUTO: 103.2 FL (ref 80–96)
MONOCYTES # BLD AUTO: 1 10^3/UL (ref 0–0.8)
MONOCYTES NFR BLD AUTO: 29.3 % (ref 0–5)
NEUTROPHILS # BLD AUTO: 1.8 10^3/UL (ref 1.8–7.7)
NEUTROPHILS NFR BLD AUTO: 50.5 % (ref 36–66)
PLATELET # BLD AUTO: 117 10^3/UL (ref 150–450)
POTASSIUM SERPL-SCNC: 3.5 MEQ/L (ref 3.5–5.1)
RBC # BLD AUTO: 2.5 10^6/UL (ref 4.3–6.1)
SODIUM SERPL-SCNC: 142 MEQ/L (ref 136–145)
WBC # BLD AUTO: 3.5 10^3/UL (ref 4–10)

## 2019-06-05 RX ADMIN — ATORVASTATIN CALCIUM SCH MG: 10 TABLET, FILM COATED ORAL at 09:42

## 2019-06-05 RX ADMIN — LEVOTHYROXINE SODIUM SCH MCG: 50 TABLET ORAL at 06:12

## 2019-06-05 RX ADMIN — TORSEMIDE SCH MG: 20 TABLET ORAL at 09:42

## 2019-06-05 RX ADMIN — FERROUS SULFATE TAB 325 MG (65 MG ELEMENTAL FE) SCH MG: 325 (65 FE) TAB at 09:42

## 2019-06-05 RX ADMIN — ENALAPRIL MALEATE SCH MG: 10 TABLET ORAL at 09:00

## 2019-06-05 RX ADMIN — GABAPENTIN SCH MG: 100 CAPSULE ORAL at 09:42

## 2019-06-05 RX ADMIN — METOPROLOL SUCCINATE SCH MG: 25 TABLET, EXTENDED RELEASE ORAL at 09:47

## 2019-06-05 NOTE — DS.PDOC
Discharge Summary


General


Date of Admission


Jun 1, 2019 at 06:14


Date of Discharge


6/5/2019





Discharge Summary


PROCEDURES PERFORMED DURING STAY: 


[None].





ADMITTING DIAGNOSES / DISCHARGE DIAGNOSES:


Acute on Chronic Systolic CHF with underlying Severe MR and Moderate AI


Atrial fibrillation:


Chronic kidney disease stage III:


Chronic anemia:


Hypertension:


Hypothyroidism:


Hyperlipidemia:


Generalized weakness, unsteady gait:


Headaches:


DVT prophylaxis





COMPLICATIONS/CHIEF COMPLAINT:


Shortness of breath and LE swelling 





HISTORY OF PRESENT ILLNESS:


Patient is an 84-year-old  male with past medical history of systolic 

CHF, severe MR, moderate AR and mild dysplastic syndrome who presented to the em

ergency room with complaints of shortness of breath, progressive over 3 days 

associated with orthopnea weakness and lower extremity swelling. In emergency 

room, patient was suspected of having fluid overload and was admitted to the 

hospital service for further evaluation and treatment.





HOSPITAL COURSE: 


Acute on Chronic Systolic CHF with underlying Severe MR and Moderate AI


- Clinically patient has had improvement in his symptoms


- Physical does not reveal any significant signs of fluid overload


- BNP was noted to be significantly elevated on admission


- Troponin 3 negative


- CXR 6/1: No acute cardiopulmonary process appreciated.


- ECHO 6/4/19: EF: 20%, Severe global hypokinesis, Moderately severe / severe 

MI, moderate pulmonary HTN


- Continue with strict ins and outs, daily weights, salt restrictions and fluid 

restriction 1500cc


- c/w Torsemide - patient has had good amount of diuresis 


- c/w Enalapril and Metoprolol


- Discussed with Cardiology; ECHO is similar to one completed as an outpatient 1

 month ago; will have outpatient f/u with Dr. Cervantes


- Patient will not be able to tolerate a Spironolactone at this time; 


- Patient was advised about possible Mitral valve surgery; but was not a 

candidate; 


- Advised him about arrhythmias and AICD - although patient is reluctant to p

roceed; will f/u with Dr. Cervantes as an outpatient 





Atrial fibrillation:


- Continue with rate control with metoprolol


- Continue full evaluation with Xarelto





Chronic kidney disease stage III:


- Creatinine baseline of approximately 1.4-1.7


- Creatinine appears to be at baseline 





Chronic anemia:


- Baseline hemoglobin of 9-10


- Hemoglobin has remained stable throughout hospital course 


- As an outpatient, he follows up with erythropoietin infusions 





Hypertension:


- Blood pressure appears well controlled


- Continue with enalapril, metoprolol and furosemide with hold parameters





Hypothyroidism:


- Continue with levothyroxine





Hyperlipidemia:


- Continue with atorvastatin





Generalized weakness, unsteady gait:


- Has been evaluated by physical therapy and is been cleared for discharge home





Headaches:


- Currently does not report any headaches


- CT head 6/1: Atrophy and microvascular ischemic changes. No acute intracranial

 hemorrhage, infarction, or mass/mass effect.





DVT prophylaxis


- c/w full anticoagulation with Xarelto 





DISCHARGE MEDICATIONS: 


Please see below.


 


ALLERGIES: 


Please see below.





PHYSICAL EXAMINATION ON DISCHARGE:


Vitals (See below)


General: Lying in bed, no acute distress, comfortable, AAOx3


HEENT: NC, AT


CVS: RRR, +S1S2


Lungs: Fair air entry b/l, no wheezing / rhonchi / rales 


Abdomen: Soft, ND, NT


Extremities: No significant edema, - Calf tenderness





LABORATORY DATA: 


Please see below.





ACTIVITY: 


[As tolerated].





DISCHARGE PLAN:


Follow up with Dr. YONATAN Velez, Dr. Cervantes and Dr. MERY Lynch within 7 days


Remain compliant with treatment plan and medications


Return to the ER if you experience any problems 





DISPOSITION:


Home with services 





DISCHARGE CONDITION: 


[Stable].





TIME SPENT ON DISCHARGE: 


32 minutes





Vital Signs/I&Os





Vital Signs








  Date Time  Temp Pulse Resp B/P (MAP) Pulse Ox O2 Delivery O2 Flow Rate FiO2


 


6/5/19 09:47  78  103/53    


 


6/5/19 06:00 97.6  16  98   


 


6/1/19 13:30      Room Air  














I&O- Last 24 Hours up to 6 AM 


 


 6/5/19





 06:00


 


Intake Total 440 ml


 


Output Total 1300 ml


 


Balance -860 ml











Laboratory Data


Labs 24H


Laboratory Tests 2


6/4/19 20:26: Bedside Glucose (Misc Panel) 174H


6/5/19 06:07: 


Immature Granulocyte % (Auto) 0.9, White Blood Count 3.5L, Red Blood Count 

2.50L, Hemoglobin 8.1L, Hematocrit 25.8L, Mean Corpuscular Volume 103.2H, Mean 

Corpuscular Hemoglobin 32.4, Mean Corpuscular Hemoglobin Concent 31.4L, Red Cell

 Distribution Width 18.4H, Platelet Count 117L, Neutrophils (%) (Auto) 50.5, 

Lymphocytes (%) (Auto) 17.0L, Monocytes (%) (Auto) 29.3H, Eosinophils (%) (Auto)

 1.7, Basophils (%) (Auto) 0.6, Neutrophils # (Auto) 1.8, Lymphocytes # (Auto) 

0.6L, Monocytes # (Auto) 1.0H, Eosinophils # (Auto) 0.1, Basophils # (Auto) 0.0,

 Nucleated Red Blood Cells % (auto) 0.0, Anion Gap 7L, Glomerular Filtration 

Rate 41.2, Blood Urea Nitrogen 22H, Creatinine 1.69H, Sodium Level 142, 

Potassium Level 3.5, Chloride Level 106, Carbon Dioxide Level 29, Calcium Level 

8.0L, Magnesium Level 2.4


CBC/BMP


Laboratory Tests


6/5/19 06:07








Red Blood Count 2.50 L, Mean Corpuscular Volume 103.2 H, Mean Corpuscular 

Hemoglobin 32.4, Mean Corpuscular Hemoglobin Concent 31.4 L, Red Cell 

Distribution Width 18.4 H, Neutrophils (%) (Auto) 50.5, Lymphocytes (%) (Auto) 

17.0 L, Monocytes (%) (Auto) 29.3 H, Eosinophils (%) (Auto) 1.7, Basophils (%) 

(Auto) 0.6, Neutrophils # (Auto) 1.8, Lymphocytes # (Auto) 0.6 L, Monocytes # 

(Auto) 1.0 H, Eosinophils # (Auto) 0.1, Basophils # (Auto) 0.0, Calcium Level 

8.0 L


FSBS





Laboratory Tests








Test


 6/4/19


20:26 Range/Units


 


 


Bedside Glucose (Misc Panel) 174   MG/DL











Discharge Medications


Scheduled


Ascorbic Acid (Vitamin C) 500 Mg Tablet, 500 MG PO DAILY, (Reported)


Atorvastatin Calcium (Atorvastatin Calcium) 10 Mg Tab, 10 MG PO DAILY, 

(Reported)


   TAKES AT NOON 


Diphenhydramine HCl (Benadryl) 25 Mg Capsule, 25 MG PO QHS, (Reported)


Docusate Sodium (Colace) 100 Mg Capsule, 100 MG PO QHS, (Reported)


Enalapril Maleate (Enalapril Maleate) 10 Mg Tablet, 5 MG PO BID


Gabapentin (Gabapentin) 100 Mg Cap, 100 MG PO BID, (Reported)


Iron Polysaccharide Complex (Ferrex 150) 150 Mg Capsule, 150 MG PO BID, 

(Reported)


Levothyroxine Sodium (Synthroid) 50 Mcg Tab, 50 MCG PO DAILY, (Reported)


Metoprolol Succinate (Toprol Xl) 25 Mg Tab, 25 MG PO DAILY, (Reported)


Multivitamins (Thera M Plus Tablet) 1 Tab Tab, 1 TAB PO DAILY, (Reported)


Rivaroxaban (Xarelto) 15 Mg Tab, 15 MG PO QPM, (Reported)


Torsemide (Torsemide) 20 Mg Tablet, 20 MG PO BID@0800,1400


   take afternoon dose every other day 


Vit C/E/Zn/Coppr/Lutein/Zeaxan (Preservision Areds 2 Softgel) 1 Each Capsule, 1 

EACH PO BID, (Reported)





Scheduled PRN


Acetaminophen/Diphenhydramine (Acetaminophen Pm Caplet) 1 Each Tablet, 1 TAB PO 

QHS PRN for INSOMNIA, (Reported)


Albuterol Sulfate (Proair Respiclick) 108 Mcg/Act Aer, 2 PUFFS INH Q4H PRN for 

SHORTNESS OF BREATH, (Reported)





Allergies


Coded Allergies:  


     No Known Allergies (Unverified , 11/2/03)











ISREAL MERINO MD                 Jun 5, 2019 10:40

## 2019-06-11 VITALS — DIASTOLIC BLOOD PRESSURE: 78 MMHG | SYSTOLIC BLOOD PRESSURE: 128 MMHG

## 2019-06-11 LAB
FERRITIN SERPL-MCNC: 51 NG/ML (ref 26–388)
HCT VFR BLD AUTO: 28.9 % (ref 42–52)
HGB BLD-MCNC: 9 G/DL (ref 13.5–17.5)
IRON SATN MFR SERPL: 55 % (ref 19.7–50)
IRON SERPL-MCNC: 148 UG/DL (ref 65–175)
LYMPHOCYTES NFR BLD AUTO: 38 % (ref 24–44)
MCH RBC QN AUTO: 32.5 PG (ref 27–33)
MCHC RBC AUTO-ENTMCNC: 31.1 G/DL (ref 32–36.5)
MCV RBC AUTO: 104.2 FL (ref 80–96)
NEUTROPHILS # BLD AUTO: 1.2 10^3/UL (ref 1.8–7.7)
NEUTROPHILS NFR BLD AUTO: 41 % (ref 36–66)
PLATELET # BLD AUTO: 141 10^3/UL (ref 150–450)
RBC # BLD AUTO: 2.77 10^6/UL (ref 4.3–6.1)
TIBC SERPL-MCNC: 269 UG/DL (ref 250–450)
WBC # BLD AUTO: 3 10^3/UL (ref 4–10)

## 2019-06-11 NOTE — MEDONC
HEMATOLOGY FOLLOW-UP/TREATMENT VISIT

 

DATE OF SERVICE:  06/11/2019

 

DIAGNOSIS:

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

sideroblasts, atypical JAK2 positive with normocellular bone marrow, normal

cytogenetics 2017. Managed with weekly erythropoietin, holding for hemoglobin 12

or greater.  RBC transfusion for hemoglobin near or below 8.5.  Most recent

transfusion 04/08/2019.

Iron deficiency anemia, unknown source of blood loss.

 

CURRENT THERAPY:

Erythropoietin margarito 60,000 units weekly, hold for hemoglobin 12 or above.

Ferrex 150 mg p.o. daily.

 

TRANSFUSION PARAMETERS:

For hemoglobin below or near 8.5, 1 unit; for hemoglobin below 7.52 units.

 

OTHER COMORBIDITIES:

Atrial fibrillation on indefinite anticoagulation.

Chronic kidney disease, stage III,

Systolic and diastolic CHF followed by Dr. Flores.  Most recent hospital

admission for acute on chronic CHF 06/01 through 06/05/2019.

 

INTERVAL HISTORY:

Kev presents today for a scheduled follow-up visit.  As noted above he had a

recent hospital admission 06/01 through 06/05 for acute on chronic CHF with a

current echo showing an LVEF of 20%.  Kev was most recently seen by Dr. Flores of

cardiology yesterday 06/10/2019.   He has had adjustments to his cardiac meds.

These can be viewed in the patient's EMR.  At present, Kev feels like he is back

to his baseline.  He denies any dyspnea, chest pain or palpitations.  He does

report chronic mild fatigue.

 

Vital signs: Weight is 85 kg, temperature 96.5, pulse 68, respirations 18, BP

128/78, O2 sat 98% at rest on room air.

General:  Exam reveals a very pleasant older gentleman who is comfortable and in

no acute distress.

Cardiac is S1-S2 with an irregular, irregular rhythm.

Lung sounds are clear bilaterally.

Abdomen soft symmetrical with normal active bowel sounds.

Extremities without clubbing, cyanosis or edema.

 

LABORATORY DATA

WBC 3.0, ANC 1.2 (stable), RBC 2.77, hemoglobin and hematocrit 9/28.9, platelets

141.  Current iron studies reveal a serum iron of 148, TIBC 269, 55% saturation

and a ferritin of 51.

 

ASSESSMENT

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

sideroblasts, atypical JAK2 positive with normal cellular bone marrow, normal

cytogenetics 2017.

 More recent finding of iron deficiency anemia with hemoccult cards 04/22/2019, 1

out of 3 positive.  To date, Kev has denied any obvious source of blood loss.  Dr Muniz advises against endoscopy. Kev continues with Ferrex 150 mg p.o. daily.

 

 

PLAN:

1.  Procrit 60,000 units today.

2.  Continue Ferrex 150 mg p.o. daily.

3.  Return weekly for a CBC, pink top tube to hold, and possible Procrit.

4.  I will see Kev for a brief office visit when he is here in 1 week.  He knows

to contact us sooner if any new symptoms, questions or problems.

 

 

 

 

 

 

Electronically Signed by

Arminda Christie NP 06/11/2019 03:42 P

 

 

 

 

 

 

 

 

DD:  Arminda Christie NP 06/11/2019 12:25 P

DT:  vick 06/11/2019 02:44 P

 

CC:   Arsenio Velez Jr, MD Vojtech Slezka, MD

## 2019-06-17 VITALS — SYSTOLIC BLOOD PRESSURE: 109 MMHG | DIASTOLIC BLOOD PRESSURE: 62 MMHG

## 2019-06-17 LAB
HCT VFR BLD AUTO: 28.2 % (ref 42–52)
HGB BLD-MCNC: 8.9 G/DL (ref 13.5–17.5)
LYMPHOCYTES NFR BLD AUTO: 31.1 % (ref 24–44)
MCH RBC QN AUTO: 32.5 PG (ref 27–33)
MCHC RBC AUTO-ENTMCNC: 31.6 G/DL (ref 32–36.5)
MCV RBC AUTO: 103.1 FL (ref 80–96)
NEUTROPHILS # BLD AUTO: 1.2 10^3/UL (ref 1.8–7.7)
NEUTROPHILS NFR BLD AUTO: 45.9 % (ref 36–66)
PLATELET # BLD AUTO: 134 10^3/UL (ref 150–450)
RBC # BLD AUTO: 2.74 10^6/UL (ref 4.3–6.1)
WBC # BLD AUTO: 2.7 10^3/UL (ref 4–10)

## 2019-06-18 NOTE — MEDONC
HEMATOLOGY FOLLOWUP/TREATMENT VISIT

 

DATE OF SERVICE:  06/17/2019

 

DIAGNOSIS:

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

sideroblasts, atypical JAK2 positive with normocellular bone marrow, normal

cytogenetics 2017.  Managed with weekly erythropoietin, holding for hemoglobin 12

or greater.  RBC transfusion for hemoglobin near or below 8.5.  Most recent

transfusion 04/08/2019.

Iron deficiency anemia, unknown source of blood loss.

 

CURRENT THERAPY:

Erythropoietin margarito 60,000 units weekly, hold for hemoglobin 12 or above.

Ferrex 150 mg p.o. daily.

 

TRANSFUSION PARAMETERS:

For hemoglobin below or near 8.5, 1 unit; for hemoglobin below 7.5, 2 units.

 

OTHER COMORBIDITIES:

Atrial fibrillation on indefinite anticoagulation.

Chronic kidney disease, stage 3.

Systolic and diastolic CHF followed by Dr. Flores.  Recent hospital admission for

acute on chronic CHF 06/01/2019 through 06/05/2019.

 

INTERVAL HISTORY:

Kev presents today for a brief followup visit, blood work and Procrit.  At

present, he claims to be in his usual state of health and offers no new

complaints.  He specifically denies dyspnea, chest pain or palpitations.  He does

report chronic mild fatigue, which is unchanged from baseline.

 

LABORATORY DATA:

WBC 2.7, ANC 1.2, RBC 2.74, H/H 8.9/28.2, platelets 134.

 

VITAL SIGNS:

Weight 84 kg, temperature 98, pulse 87, respirations 18, /62, O2 sat 98% at

rest on room air.

 

PHYSICAL EXAMINATION:

Deferred.

 

IMPRESSION:

Kev is a miguel, 86-year-old male with a history of IPSS-R-2 low risk

myelodysplastic syndrome/refractory anemia without ring sideroblasts, atypical

JAK2 positive with normal cellular bone marrow, normal cytogenetics 2017.

 

More recent finding of iron deficiency anemia with hemoccult cards 04/22/2019, 1

out of 3 positive.  To date, Kev has denied any obvious source of blood loss.  Dr Muniz advises against endoscopy.  Kev continues with Ferrex 150 mg p.o.

daily.

 

PLAN:

1.  Procrit 60,000 units today.

2.  Continue Ferrex 150 mg p.o. daily.

3.  Return weekly for CBC, pink top tube to hold, and possible Procrit.

4.  Kev will be seen for an office visit and physical exam in 4 weeks, same day

as labs and Procrit.  He knows to contact us sooner with any new symptoms,

questions or problems.

 

 

 

 

 

Electronically Signed by

Arminda Christie NP 06/18/2019 12:55 P

 

 

 

 

 

 

 

 

DD:  Arminda Christie NP 06/17/2019 10:34 A

DT:  winnie 06/18/2019 09:39 A

 

CC:   Arsenio Velez Jr, MD Vojtech Slezka, MD

## 2019-06-24 VITALS — SYSTOLIC BLOOD PRESSURE: 111 MMHG | DIASTOLIC BLOOD PRESSURE: 65 MMHG

## 2019-06-24 LAB
HCT VFR BLD AUTO: 27.7 % (ref 42–52)
HGB BLD-MCNC: 8.9 G/DL (ref 13.5–17.5)
LYMPHOCYTES NFR BLD AUTO: 32.6 % (ref 24–44)
MCH RBC QN AUTO: 33.6 PG (ref 27–33)
MCHC RBC AUTO-ENTMCNC: 32.1 G/DL (ref 32–36.5)
MCV RBC AUTO: 104.6 FL (ref 80–96)
NEUTROPHILS # BLD AUTO: 1.4 10^3/UL (ref 1.8–7.7)
NEUTROPHILS NFR BLD AUTO: 46.2 % (ref 36–66)
PLATELET # BLD AUTO: 132 10^3/UL (ref 150–450)
RBC # BLD AUTO: 2.65 10^6/UL (ref 4.3–6.1)
WBC # BLD AUTO: 3 10^3/UL (ref 4–10)

## 2019-07-01 VITALS — DIASTOLIC BLOOD PRESSURE: 66 MMHG | SYSTOLIC BLOOD PRESSURE: 104 MMHG

## 2019-07-01 LAB
HCT VFR BLD AUTO: 26.3 % (ref 42–52)
HGB BLD-MCNC: 8.4 G/DL (ref 13.5–17.5)
LYMPHOCYTES NFR BLD AUTO: 32.2 % (ref 24–44)
MCH RBC QN AUTO: 33.1 PG (ref 27–33)
MCHC RBC AUTO-ENTMCNC: 31.9 G/DL (ref 32–36.5)
MCV RBC AUTO: 103.4 FL (ref 80–96)
NEUTROPHILS # BLD AUTO: 1.5 10^3/UL (ref 1.8–7.7)
NEUTROPHILS NFR BLD AUTO: 44.6 % (ref 36–66)
PLATELET # BLD AUTO: 136 10^3/UL (ref 150–450)
RBC # BLD AUTO: 2.54 10^6/UL (ref 4.3–6.1)
WBC # BLD AUTO: 3.4 10^3/UL (ref 4–10)

## 2019-07-01 RX ADMIN — ERYTHROPOIETIN ONE UNIT: 40000 INJECTION, SOLUTION INTRAVENOUS; SUBCUTANEOUS at 08:28

## 2019-07-08 LAB
HCT VFR BLD AUTO: 26.6 % (ref 42–52)
HGB BLD-MCNC: 8.4 G/DL (ref 13.5–17.5)
LYMPHOCYTES NFR BLD AUTO: 31.8 % (ref 24–44)
MCH RBC QN AUTO: 32.9 PG (ref 27–33)
MCHC RBC AUTO-ENTMCNC: 31.6 G/DL (ref 32–36.5)
MCV RBC AUTO: 104.4 FL (ref 80–96)
NEUTROPHILS # BLD AUTO: 1.2 10^3/UL (ref 1.8–7.7)
NEUTROPHILS NFR BLD AUTO: 43 % (ref 36–66)
PLATELET # BLD AUTO: 129 10^3/UL (ref 150–450)
RBC # BLD AUTO: 2.55 10^6/UL (ref 4.3–6.1)
WBC # BLD AUTO: 2.8 10^3/UL (ref 4–10)

## 2019-07-15 VITALS — DIASTOLIC BLOOD PRESSURE: 71 MMHG | SYSTOLIC BLOOD PRESSURE: 118 MMHG

## 2019-07-15 LAB
FERRITIN SERPL-MCNC: 37 NG/ML (ref 26–388)
HCT VFR BLD AUTO: 26.8 % (ref 42–52)
HGB BLD-MCNC: 8.8 G/DL (ref 13.5–17.5)
IRON SATN MFR SERPL: 79.3 % (ref 19.7–50)
IRON SERPL-MCNC: 203 UG/DL (ref 65–175)
LYMPHOCYTES NFR BLD AUTO: 32.4 % (ref 24–44)
MCH RBC QN AUTO: 33.8 PG (ref 27–33)
MCHC RBC AUTO-ENTMCNC: 32.8 G/DL (ref 32–36.5)
MCV RBC AUTO: 103.2 FL (ref 80–96)
NEUTROPHILS # BLD AUTO: 1.7 10^3/UL (ref 1.8–7.7)
NEUTROPHILS NFR BLD AUTO: 43.6 % (ref 36–66)
PLATELET # BLD AUTO: 147 10^3/UL (ref 150–450)
RBC # BLD AUTO: 2.6 10^6/UL (ref 4.3–6.1)
TIBC SERPL-MCNC: 256 UG/DL (ref 250–450)
WBC # BLD AUTO: 3.9 10^3/UL (ref 4–10)

## 2019-07-15 NOTE — [facility]ON[facility]
HEMATOLOGY FOLLOWUP/TREATMENT VISIT

 

DATE OF SERVICE:  07/15/2019

 

DIAGNOSIS:

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

sideroblasts, atypical JAK2 positive with normocellular bone marrow, normal

cytogenetics 2017.  Managed with weekly erythropoietin, holding for hemoglobin 12

or greater.  RBC transfusion for hemoglobin near or below 8.5.  Most recent

transfusion, 04/08/2019.

Iron-deficiency anemia, unknown source of blood loss.

 

CURRENT THERAPY:

Erythropoietin margarito 60,000 units weekly, hold for hemoglobin 12 or above.

Ferrex 150 mg p.o. daily.

 

Transfusion parameters for hemoglobin below or near 8.5, 1 unit; for hemoglobin

below 7.5, 2 units.

 

OTHER COMORBIDITIES:

Atrial fibrillation on indefinite anticoagulation.

Chronic kidney disease, stage III.

Systolic and diastolic CHF, followed by Dr. Flores.  Most recent hospital

admission for acute on chronic CHF 06/01 through 06/05/2019.

 

INTERVAL HISTORY:

Kev presents today for brief follow-up visit, blood work, and Procrit.  At

present, he claims to be in his usual state of health and offers no new

complaints.  He specifically denies dyspnea, chest pain, or palpitations.  He

denies any lower extremity edema.  He does report chronic mild fatigue, which is

unchanged from baseline.

 

REVIEW OF SYSTEMS:

12-system review completed and as noted above for pertinent positives and

negatives.  The remaining 12-system review is negative.

 

VITAL SIGNS:  Weight 83.8 kg.  96.7, pulse 94, respirations 18, /71, O2 sat

is not documented. Physical exam deferred.

 

LABORATORY DATA:

CBC today:  WBC 3.9, ANC 1.7, RBC 2.60, H and H 8.8, 26.8, platelets 147.

A CMP and iron studies are pending as of this dictation.

 

IMPRESSION:

Kev is a very pleasant 86-year-old male with a history of IPSS-R-2, low-risk

myelodysplastic syndrome/refractory anemia without ring sideroblasts, atypical

JAK2 positive with normocellular bone marrow, normal cytogenetics, 2017.

 

Most recent finding of iron-deficiency anemia with hemoccult cards 04/22/2019,

1/3 positive.  To date, Kev has denied any obvious source of blood loss.  Dr. Muniz advises against endoscopy.  Kev continues with 150 mg of Ferrex p.o.

daily.

 

PLAN:

1.  Procrit 60,000 units subcu today.

2.  Continue Ferrex 150 mg p.o. daily.

3.  Return weekly for blood work including CBC, pink top to hold, CMP, and iron

studies, possible Procrit.

4.  Kev will be seen for an office visit in 4 weeks, same day as labs and

Procrit.  He knows to contact us in the interim with any new symptoms, questions,

or problems.

 

 

Electronically Signed by

Arminda Christie NP 07/16/2019 07:26 A

 

 

 

 

 

 

 

 

DD:  Arminda Christie NP 07/15/2019 08:47 A

DT:  lyndsay 07/15/2019 02:16 P

 

CC:   Arsenio Velez Jr, MD Vojtech Slezka, MD

## 2019-07-23 LAB
ALBUMIN SERPL BCG-MCNC: 4.2 GM/DL (ref 3.5–5.2)
BUN SERPL-MCNC: 50 MG/DL (ref 6–20)
CHLORIDE SERPL-SCNC: 96 MMOL/L (ref 98–107)
CO2 SERPL-SCNC: 34 MEQ/L (ref 23–31)
CREAT SERPL-MCNC: 2.53 MG/DL (ref 0.9–1.3)
FERRITIN SERPL-MCNC: 51 NG/ML (ref 26–388)
GFR SERPL CREATININE-BSD FRML MDRD: 25.8 ML/MIN/{1.73_M2} (ref 35–?)
GLUCOSE SERPL-MCNC: 127 MG/DL (ref 70–105)
HCT VFR BLD AUTO: 26.4 % (ref 42–52)
HGB BLD-MCNC: 8.4 G/DL (ref 13.5–17.5)
IRON SATN MFR SERPL: 57.7 % (ref 19.7–50)
IRON SERPL-MCNC: 153 UG/DL (ref 65–175)
LYMPHOCYTES NFR BLD AUTO: 31.5 % (ref 24–44)
MCH RBC QN AUTO: 33.1 PG (ref 27–33)
MCHC RBC AUTO-ENTMCNC: 31.8 G/DL (ref 32–36.5)
MCV RBC AUTO: 103.9 FL (ref 80–96)
NEUTROPHILS # BLD AUTO: 1.6 10^3/UL (ref 1.8–7.7)
NEUTROPHILS NFR BLD AUTO: 47.5 % (ref 36–66)
PLATELET # BLD AUTO: 153 10^3/UL (ref 150–450)
PROT SERPL-MCNC: 6.4 GM/DL (ref 6.4–8.3)
RBC # BLD AUTO: 2.54 10^6/UL (ref 4.3–6.1)
SODIUM SERPL-SCNC: 140 MMOL/L (ref 135–145)
TIBC SERPL-MCNC: 265 UG/DL (ref 250–450)
WBC # BLD AUTO: 3.4 10^3/UL (ref 4–10)

## 2019-07-25 NOTE — [facility]
TELEPHONE NOTE

 

DATE OF SERVICE: 2019

 

Kev came in today for a CBC, chemistries and possible Procrit.  For an H and H of

8.4 and 26.4, he was given 60,000 units of Procrit.  After receiving Procrit Kev

departed from the office.  Later this morning, I received the results of his

chemistries that were drawn earlier today.  Kev has a significant change in his

BUN and creatinine from baseline.  BUN is up to 50, creatinine up to 2.53.

Potassium is within normal parameters at 3.7.

 

Dr. Best Alva was contacted (as Dr. Flores is away on vacation this week).  Dr. Alva was advised of the patient's current renal function.  Kev has a history of

chronic systolic CHF with underlying severe MR and moderate AI.  Most recent echo

2019 indicating an LVEF of around 20%.  Kev has recently had a change in

his cardiac meds by Dr. Flores.  Currently taking torsemide 20 mg alternating

with 40 mg p.o. every other day.  He continues with lisinopril 25 mg p.o. daily

and Toprol XL 25 mg one p.o. daily.

 

In regards to the patient's progressive renal insufficiency Dr. Alva recommends

the followin.  Decrease the current dose of torsemide by one-half (10 mg p.o. alternating

with 30 mg p.o. every other day).  Dr. Alva is also requesting that lisinopril be

discontinued at this time until further notice.

 

The patient's wife was contacted with the above lab results and recommendations

per Dr. Alva. She verbalizes understanding.  We will tentatively plan to repeat

chemistries when Kev is here in 1 week for his Procrit with those results to be

faxed to Dr. Alva/Dr. Flores.  Dr. Alva's office will also be contacting Kev for a

followup appointment with them as well.

 

 

 

Electronically Signed by

Arminda Christie NP 2019 03:43 P

 

 

 

 

 

 

 

 

DD:  Arminda Christie NP 2019 12:29 P

DT:  np 2019 09:05 A

 

CC:

## 2019-07-29 VITALS — SYSTOLIC BLOOD PRESSURE: 98 MMHG | DIASTOLIC BLOOD PRESSURE: 57 MMHG

## 2019-07-29 LAB
ALBUMIN SERPL BCG-MCNC: 4.1 GM/DL (ref 3.5–5.2)
BUN SERPL-MCNC: 45 MG/DL (ref 6–20)
CHLORIDE SERPL-SCNC: 96 MMOL/L (ref 98–107)
CO2 SERPL-SCNC: 31 MEQ/L (ref 23–31)
CREAT SERPL-MCNC: 2.7 MG/DL (ref 0.9–1.3)
GFR SERPL CREATININE-BSD FRML MDRD: 24 ML/MIN/{1.73_M2} (ref 35–?)
GLUCOSE SERPL-MCNC: 155 MG/DL (ref 70–105)
HCT VFR BLD AUTO: 24 % (ref 42–52)
HGB BLD-MCNC: 7.8 G/DL (ref 13.5–17.5)
LYMPHOCYTES NFR BLD AUTO: 26.7 % (ref 24–44)
MCH RBC QN AUTO: 33.6 PG (ref 27–33)
MCHC RBC AUTO-ENTMCNC: 32.5 G/DL (ref 32–36.5)
MCV RBC AUTO: 103.5 FL (ref 80–96)
NEUTROPHILS # BLD AUTO: 1.6 10^3/UL (ref 1.8–7.7)
NEUTROPHILS NFR BLD AUTO: 49.7 % (ref 36–66)
PLATELET # BLD AUTO: 145 10^3/UL (ref 150–450)
PROT SERPL-MCNC: 6.4 GM/DL (ref 6.4–8.3)
RBC # BLD AUTO: 2.32 10^6/UL (ref 4.3–6.1)
SODIUM SERPL-SCNC: 136 MMOL/L (ref 135–145)
WBC # BLD AUTO: 3.3 10^3/UL (ref 4–10)

## 2019-07-30 NOTE — [facility]
DATE OF SERVICE:  07/29/2019

 

REASON FOR VISIT:

Followup in a patient with anemia with chronic kidney disease.  The patient has

been receiving 60,000 units of Procrit.  He denies any new symptoms except that

he experienced more fatigue.  His blood pressure was found to be lower than

usual.  The patient has been reaching out to his cardiologist.  His creatinine

was noted to have been increasingly trending up.

 

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

sideroblasts.

JAK2 positive with normocellular bone marrow, normal cytogenetics 2017.

Weekly erythropoietin, with Procrit 60,000 units weekly.

RBC transfusion for hemoglobin less or equal to 8.5.  Recent transfusion on

04/08/2019.

Iron-deficiency anemia, unknown source of blood loss.

 

INTERVAL HISTORY/REVIEW OF SYSTEMS:

As per above.  Remainder of review of systems unchanged.

 

ALLERGIES:

No known drug allergies.

 

PAST MEDICAL/SURGICAL HISTORY/MEDICATIONS:

Chart reviewed and unchanged.

 

PHYSICAL EXAMINATION:

Awake, alert, oriented, not in acute distress.

Temperature 97.8, pulse 90, respiratory rate 18, blood pressure 98/77, pulse

oximetry 100.

Lungs:  Bilaterally clear.  No added sounds.

Cardiac:  S1 and S2, regular.  Systolic ejection murmur on left.

Abdomen:  Soft.  Nontender.  No organomegaly.

Lower Extremities:  No cyanosis.  There is evidence of trace edema.

Neurologic:  Unremarkable.

 

LABORATORY DATA:

White blood cell count 3.3, hemoglobin 7.8, platelet count 45.  Creatinine 2.7.

 

 

ASSESSMENT AND PLAN:

This is an 86-year-old  male with multiple comorbidities including heart

failure with EF of 20%.  The patient has a diagnosis of myelodysplastic syndrome,

JAK2 mutation positive.  Due to the worsening of his cytopenia, the patient will

have the bone marrow biopsy repeated as well as he will be transfused one unit of

packed red blood cells due to his cardiac comorbidities and his current

hemoglobin of 7.8.  At this current visit, the patient will not receive Procrit

and he was advised and referred to nephrology for workup.

 

I spent 35 minutes during this encounter with more than 50% of the time

counseling the patient about the above plan of care.  The spouse and the patient

voiced understanding and to proceed as above.

 

 

 

 

Electronically Signed by

Eddi Tamayo MD 07/31/2019 09:18 P

 

 

 

 

 

 

 

 

DD:  Eddi Tamayo MD 07/29/2019 04:50 P

DT:  winnie 07/30/2019 11:58 A

 

CC:

## 2019-08-05 LAB
ALBUMIN SERPL BCG-MCNC: 4 GM/DL (ref 3.5–5.2)
BUN SERPL-MCNC: 35 MG/DL (ref 6–20)
CHLORIDE SERPL-SCNC: 102 MMOL/L (ref 98–107)
CO2 SERPL-SCNC: 29 MEQ/L (ref 23–31)
CREAT SERPL-MCNC: 2.08 MG/DL (ref 0.9–1.3)
GFR SERPL CREATININE-BSD FRML MDRD: 32.4 ML/MIN/{1.73_M2} (ref 35–?)
GLUCOSE SERPL-MCNC: 151 MG/DL (ref 70–105)
HCT VFR BLD AUTO: 26.3 % (ref 42–52)
HGB BLD-MCNC: 8.3 G/DL (ref 13.5–17.5)
LYMPHOCYTES NFR BLD AUTO: 22.4 % (ref 24–44)
MCH RBC QN AUTO: 32.7 PG (ref 27–33)
MCHC RBC AUTO-ENTMCNC: 31.6 G/DL (ref 32–36.5)
MCV RBC AUTO: 103.7 FL (ref 80–96)
NEUTROPHILS # BLD AUTO: 2.2 10^3/UL (ref 1.8–7.7)
NEUTROPHILS NFR BLD AUTO: 54.3 % (ref 36–66)
PLATELET # BLD AUTO: 141 10^3/UL (ref 150–450)
PROT SERPL-MCNC: 6.3 GM/DL (ref 6.4–8.3)
RBC # BLD AUTO: 2.54 10^6/UL (ref 4.3–6.1)
SODIUM SERPL-SCNC: 140 MMOL/L (ref 135–145)
WBC # BLD AUTO: 4 10^3/UL (ref 4–10)

## 2019-08-05 RX ADMIN — ERYTHROPOIETIN ONE UNIT: 40000 INJECTION, SOLUTION INTRAVENOUS; SUBCUTANEOUS at 08:32

## 2019-08-12 VITALS — SYSTOLIC BLOOD PRESSURE: 106 MMHG | DIASTOLIC BLOOD PRESSURE: 66 MMHG

## 2019-08-12 LAB
ALBUMIN SERPL BCG-MCNC: 3.3 GM/DL (ref 3.2–5.2)
ALT SERPL W P-5'-P-CCNC: 21 U/L (ref 12–78)
BILIRUB SERPL-MCNC: 0.8 MG/DL (ref 0.2–1)
BUN SERPL-MCNC: 23 MG/DL (ref 7–18)
CALCIUM SERPL-MCNC: 8.5 MG/DL (ref 8.8–10.2)
CHLORIDE SERPL-SCNC: 107 MEQ/L (ref 98–107)
CO2 SERPL-SCNC: 28 MEQ/L (ref 21–32)
CREAT SERPL-MCNC: 1.79 MG/DL (ref 0.7–1.3)
GFR SERPL CREATININE-BSD FRML MDRD: 38.5 ML/MIN/{1.73_M2} (ref 35–?)
GLUCOSE SERPL-MCNC: 126 MG/DL (ref 70–100)
HCT VFR BLD AUTO: 25.5 % (ref 42–52)
HGB BLD-MCNC: 8.1 G/DL (ref 13.5–17.5)
LYMPHOCYTES NFR BLD AUTO: 21.7 % (ref 24–44)
MCH RBC QN AUTO: 33.1 PG (ref 27–33)
MCHC RBC AUTO-ENTMCNC: 31.8 G/DL (ref 32–36.5)
MCV RBC AUTO: 104 FL (ref 80–96)
NEUTROPHILS # BLD AUTO: 2.1 10^3/UL (ref 1.8–7.7)
NEUTROPHILS NFR BLD AUTO: 57.6 % (ref 36–66)
PLATELET # BLD AUTO: 132 10^3/UL (ref 150–450)
POTASSIUM SERPL-SCNC: 3.3 MEQ/L (ref 3.5–5.1)
PROT SERPL-MCNC: 6.3 GM/DL (ref 6.4–8.2)
RBC # BLD AUTO: 2.45 10^6/UL (ref 4.3–6.1)
SODIUM SERPL-SCNC: 143 MEQ/L (ref 136–145)
WBC # BLD AUTO: 3.6 10^3/UL (ref 4–10)

## 2019-08-19 VITALS — DIASTOLIC BLOOD PRESSURE: 71 MMHG | SYSTOLIC BLOOD PRESSURE: 125 MMHG

## 2019-08-19 LAB
ALBUMIN SERPL BCG-MCNC: 3.8 GM/DL (ref 3.5–5.2)
BUN SERPL-MCNC: 21 MG/DL (ref 6–20)
CHLORIDE SERPL-SCNC: 104 MMOL/L (ref 98–107)
CO2 SERPL-SCNC: 28 MEQ/L (ref 23–31)
CREAT SERPL-MCNC: 1.79 MG/DL (ref 0.9–1.3)
GFR SERPL CREATININE-BSD FRML MDRD: 38.5 ML/MIN/{1.73_M2} (ref 35–?)
GLUCOSE SERPL-MCNC: 113 MG/DL (ref 70–105)
HCT VFR BLD AUTO: 26.9 % (ref 42–52)
HGB BLD-MCNC: 8.5 G/DL (ref 13.5–17.5)
LYMPHOCYTES NFR BLD AUTO: 28.1 % (ref 24–44)
MCH RBC QN AUTO: 32.9 PG (ref 27–33)
MCHC RBC AUTO-ENTMCNC: 31.6 G/DL (ref 32–36.5)
MCV RBC AUTO: 104.4 FL (ref 80–96)
NEUTROPHILS # BLD AUTO: 1.4 10^3/UL (ref 1.8–7.7)
NEUTROPHILS NFR BLD AUTO: 47.7 % (ref 36–66)
PLATELET # BLD AUTO: 116 10^3/UL (ref 150–450)
PROT SERPL-MCNC: 6.1 GM/DL (ref 6.4–8.3)
RBC # BLD AUTO: 2.58 10^6/UL (ref 4.3–6.1)
SODIUM SERPL-SCNC: 141 MMOL/L (ref 135–145)
WBC # BLD AUTO: 2.9 10^3/UL (ref 4–10)

## 2019-08-21 NOTE — [facility]
MEDICAL ONCOLOGY HEMATOLOGY FOLLOWUP VISIT

 

DATE OF SERVICE:  08/19/2019

 

DIAGNOSIS:

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

sideroblasts.  JAK2 positive with normal cellular bone marrow, normal

cytogenetics in 2017.

Iron deficiency anemia, unknown source of blood loss.

 

TREATMENT HISTORY:

Weekly Procrit 60,000 units for a hemoglobin less than 12.  RBC transfusion for

hemoglobin below or near 8.5 1 unit, for hemoglobin below 7.5 2 units.

Ferrex 150 mg p.o. daily.

 

OTHER COMORBIDITIES:

Atrial fibrillation on indefinite anticoagulation.

Chronic kidney disease stage III.

Systolic and diastolic CHF followed by Dr. Flores.

 

INTERVAL HISTORY:

Kev presents today for a brief followup visit, blood work and Procrit.  He most

recently received 1 unit of packed red blood cells on 07/29/2019 for a hemoglobin

of 7.8.  Dr. Tmaayo held Kev's Procrit that day.  Kev states "I felt it all week"

meaning that he did not receive his Procrit.  He most recently has been having

problems with increased bilateral lower extremity edema.  Dr. Flroes is managing

Kev's diuretics.  Kev most recently was also seen by nephrology for progressive

renal insufficiency.  Reportedly he is scheduled to see them again in 2 months.

Kev's other complaint today is of insomnia.  He is asking if we can give him

anything for sleep.  I instructed him to check with Dr. Velez.

 

At present, Kev denies dyspnea, chest pain or palpitations.  He does have 1+

pretibial edema bilaterally.  Kev denies any signs or symptoms of infection, i.e.

fevers or chills.  He denies any excessive ease of bruising or spontaneous

bleeding.

 

As previously documented, Kev has a history of iron deficiency anemia.  Previous

endoscopy with Dr. Muniz has failed to show any obvious source of GI blood

loss.

 

PHYSICAL EXAMINATION:

Weight is 86 kg, temperature 96.8, pulse 88, respirations 18, /71, O2 sat

99% at rest on room air.

General exam reveals a pleasant older gentleman who is comfortable and in no

acute distress.

Cardiac is S1-S2 with a systolic ejection murmur on the left.

Lung sounds are clear bilaterally without rales, wheeze or rhonchi.

Abdomen soft, nontender with normal active bowel sounds.  No hepatosplenomegaly,

mass or bruit.

Extremities positive for 1+ pretibial edema bilaterally.

 

LABORATORY DATA:

CBC today WBC 2.9, ANC 1.4, RBC 2.58, hemoglobin and hematocrit 8.5/26.9,

platelets 116.  Chemistries show a stable creatinine of 1.79, potassium is low at

2.9.  The remainder of the patient's chemistries are unremarkable.

 

IMPRESSION:

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

sideroblasts, atypical JAK2 positive with normal cellular bone marrow, normal

cytogenetics 2017, more recent finding of iron deficiency anemia with positive

hemoccult cards.  Previous endoscopy with Dr. Muniz failed to show any

obvious source of GI blood loss.  Kev continues with Ferrex 150 mg p.o. daily

with evidence of replenishment of his iron stores on most recent iron studies

dated 07/23/2019.  Kev remains moderately anemic, however, his hemoglobin and

hematocrit are stable.  I am reluctant to transfuse him given his peripheral

edema.  We will hold off on transfusing him right at this time.  We will give Kev

60,000 units of Procrit today.  Kev's wife will followup with Dr. Flores today as

well regarding the patient's fluid retention and for further recommendations.

 

In regards to the patient's hypokalemia, this will also be reported to Dr. Flores.  We will E-scribe a prescription for K-Dur 20 mEq to the patient's

pharmacy.  Kev has been instructed to take one K-Dur p.o. daily x3 days.  We will

then defer to Dr. Flores for ongoing management of the patient's potassium.

 

In reference to the patient's chronic neutropenia and thrombocytopenia, his

counts remain stable.  At his last visit with Dr. Tamayo on 07/29/2019, Dr. Tamayo

recommended repeat bone marrow, however at this time the patient adamantly

declines.

 

Kev will be given a return appointment for weekly labs and Procrit.  We will see

him for a follow-up visit in 4 weeks, certainly sooner if any new symptoms,

questions or problems.

 

 

 

 

 

 

 

Electronically Signed by

Arminda Christie NP 08/22/2019 08:07 A

 

 

 

 

 

 

 

 

DD:  Arminda Christie NP 08/19/2019 09:03 A

DT:  vick 08/21/2019 12:46 P

 

CC:   Cesar Agustin, Jr, MD Roxana Urias MD

## 2019-08-22 ENCOUNTER — HOSPITAL ENCOUNTER (INPATIENT)
Dept: HOSPITAL 53 - M ED | Age: 84
LOS: 3 days | Discharge: HOME | DRG: 291 | End: 2019-08-25
Attending: INTERNAL MEDICINE | Admitting: INTERNAL MEDICINE
Payer: MEDICARE

## 2019-08-22 VITALS — BODY MASS INDEX: 24.31 KG/M2 | HEIGHT: 72 IN | WEIGHT: 179.46 LBS

## 2019-08-22 VITALS — OXYGEN SATURATION: 79 %

## 2019-08-22 DIAGNOSIS — E78.5: ICD-10-CM

## 2019-08-22 DIAGNOSIS — I08.0: ICD-10-CM

## 2019-08-22 DIAGNOSIS — I50.43: ICD-10-CM

## 2019-08-22 DIAGNOSIS — I27.20: ICD-10-CM

## 2019-08-22 DIAGNOSIS — I13.0: Primary | ICD-10-CM

## 2019-08-22 DIAGNOSIS — K64.9: ICD-10-CM

## 2019-08-22 DIAGNOSIS — Z87.891: ICD-10-CM

## 2019-08-22 DIAGNOSIS — I48.2: ICD-10-CM

## 2019-08-22 DIAGNOSIS — E03.9: ICD-10-CM

## 2019-08-22 DIAGNOSIS — Z79.899: ICD-10-CM

## 2019-08-22 DIAGNOSIS — Z85.828: ICD-10-CM

## 2019-08-22 DIAGNOSIS — N18.3: ICD-10-CM

## 2019-08-22 DIAGNOSIS — D46.9: ICD-10-CM

## 2019-08-22 DIAGNOSIS — D64.9: ICD-10-CM

## 2019-08-22 DIAGNOSIS — Z79.01: ICD-10-CM

## 2019-08-22 LAB
ALBUMIN SERPL BCG-MCNC: 3.8 GM/DL (ref 3.2–5.2)
ALT SERPL W P-5'-P-CCNC: 22 U/L (ref 12–78)
BASOPHILS # BLD AUTO: 0 10^3/UL (ref 0–0.2)
BASOPHILS NFR BLD AUTO: 0.7 % (ref 0–1)
BILIRUB SERPL-MCNC: 1 MG/DL (ref 0.2–1)
BUN SERPL-MCNC: 25 MG/DL (ref 7–18)
CALCIUM SERPL-MCNC: 9.6 MG/DL (ref 8.8–10.2)
CHLORIDE SERPL-SCNC: 107 MEQ/L (ref 98–107)
CK MB CFR.DF SERPL CALC: 3.85
CK MB SERPL-MCNC: 6.5 NG/ML (ref ?–3.6)
CK SERPL-CCNC: 169 U/L (ref 39–308)
CO2 SERPL-SCNC: 27 MEQ/L (ref 21–32)
CREAT SERPL-MCNC: 1.89 MG/DL (ref 0.7–1.3)
EOSINOPHIL # BLD AUTO: 0 10^3/UL (ref 0–0.5)
EOSINOPHIL NFR BLD AUTO: 0.3 % (ref 0–3)
GFR SERPL CREATININE-BSD FRML MDRD: 36.2 ML/MIN/{1.73_M2} (ref 35–?)
GLUCOSE SERPL-MCNC: 119 MG/DL (ref 70–100)
HCT VFR BLD AUTO: 28.9 % (ref 42–52)
HGB BLD-MCNC: 9.2 G/DL (ref 13.5–17.5)
INR PPP: 1.56
LIPASE SERPL-CCNC: 191 U/L (ref 73–393)
LYMPHOCYTES # BLD AUTO: 0.6 10^3/UL (ref 1.5–4.5)
LYMPHOCYTES NFR BLD AUTO: 19.7 % (ref 24–44)
MCH RBC QN AUTO: 35 PG (ref 27–33)
MCHC RBC AUTO-ENTMCNC: 31.8 G/DL (ref 32–36.5)
MCV RBC AUTO: 109.9 FL (ref 80–96)
MONOCYTES # BLD AUTO: 0.9 10^3/UL (ref 0–0.8)
MONOCYTES NFR BLD AUTO: 29.2 % (ref 0–5)
NEUTROPHILS # BLD AUTO: 1.5 10^3/UL (ref 1.8–7.7)
NEUTROPHILS NFR BLD AUTO: 49.8 % (ref 36–66)
NT-PRO BNP: (no result) PG/ML (ref ?–450)
PLATELET # BLD AUTO: 105 10^3/UL (ref 150–450)
POTASSIUM SERPL-SCNC: 3.4 MEQ/L (ref 3.5–5.1)
PROT SERPL-MCNC: 7.6 GM/DL (ref 6.4–8.2)
PROTHROMBIN TIME: 18.4 SECONDS (ref 11.8–14)
RBC # BLD AUTO: 2.63 10^6/UL (ref 4.3–6.1)
SODIUM SERPL-SCNC: 142 MEQ/L (ref 136–145)
TROPONIN I SERPL-MCNC: < 0.02 NG/ML (ref ?–0.1)
WBC # BLD AUTO: 3.1 10^3/UL (ref 4–10)

## 2019-08-22 NOTE — REP
HISTORY: Dyspnea.

 

COMPARISON: 06/01/2019

 

The technique utilized in obtaining the radiograph has magnified the cardiac

silhouette and accentuated the interstitial markings.

 

 

 

There is global cardiomegaly accentuated by technique. The lung fields are

essentially unchanged. No acute patchy parenchymal opacities or pleural effusions

have developed. The osseous structures are stable and intact.

 

IMPRESSION:

 

Global cardiomegaly, but no evidence of acute cardiopulmonary disease. No

significant change from the prior exam.

 

 

Electronically Signed by

Karlos Calvo DO 08/22/2019 06:17 P

## 2019-08-23 VITALS — SYSTOLIC BLOOD PRESSURE: 127 MMHG | DIASTOLIC BLOOD PRESSURE: 78 MMHG

## 2019-08-23 VITALS — DIASTOLIC BLOOD PRESSURE: 67 MMHG | SYSTOLIC BLOOD PRESSURE: 110 MMHG

## 2019-08-23 VITALS — DIASTOLIC BLOOD PRESSURE: 71 MMHG | SYSTOLIC BLOOD PRESSURE: 103 MMHG

## 2019-08-23 VITALS — SYSTOLIC BLOOD PRESSURE: 115 MMHG | DIASTOLIC BLOOD PRESSURE: 57 MMHG

## 2019-08-23 VITALS — SYSTOLIC BLOOD PRESSURE: 105 MMHG | DIASTOLIC BLOOD PRESSURE: 52 MMHG

## 2019-08-23 VITALS — DIASTOLIC BLOOD PRESSURE: 63 MMHG | SYSTOLIC BLOOD PRESSURE: 117 MMHG

## 2019-08-23 VITALS — DIASTOLIC BLOOD PRESSURE: 73 MMHG | SYSTOLIC BLOOD PRESSURE: 103 MMHG

## 2019-08-23 LAB
BUN SERPL-MCNC: 28 MG/DL (ref 7–18)
CALCIUM SERPL-MCNC: 9.1 MG/DL (ref 8.8–10.2)
CHLORIDE SERPL-SCNC: 108 MEQ/L (ref 98–107)
CO2 SERPL-SCNC: 28 MEQ/L (ref 21–32)
CREAT SERPL-MCNC: 1.93 MG/DL (ref 0.7–1.3)
GFR SERPL CREATININE-BSD FRML MDRD: 35.3 ML/MIN/{1.73_M2} (ref 35–?)
GLUCOSE SERPL-MCNC: 113 MG/DL (ref 70–100)
HCT VFR BLD AUTO: 25.8 % (ref 42–52)
HGB BLD-MCNC: 8.2 G/DL (ref 13.5–17.5)
MAGNESIUM SERPL-MCNC: 2.5 MG/DL (ref 1.8–2.4)
MCH RBC QN AUTO: 34.2 PG (ref 27–33)
MCHC RBC AUTO-ENTMCNC: 31.8 G/DL (ref 32–36.5)
MCV RBC AUTO: 107.5 FL (ref 80–96)
PHOSPHATE SERPL-MCNC: 3.3 MG/DL (ref 2.5–4.9)
PLATELET # BLD AUTO: 106 10^3/UL (ref 150–450)
POTASSIUM SERPL-SCNC: 4 MEQ/L (ref 3.5–5.1)
RBC # BLD AUTO: 2.4 10^6/UL (ref 4.3–6.1)
SODIUM SERPL-SCNC: 142 MEQ/L (ref 136–145)
WBC # BLD AUTO: 3.9 10^3/UL (ref 4–10)

## 2019-08-23 RX ADMIN — LEVOTHYROXINE SODIUM SCH MCG: 50 TABLET ORAL at 05:24

## 2019-08-23 RX ADMIN — DOCUSATE SODIUM SCH MG: 100 CAPSULE, LIQUID FILLED ORAL at 09:25

## 2019-08-23 RX ADMIN — METOPROLOL SUCCINATE SCH MG: 25 TABLET, EXTENDED RELEASE ORAL at 09:26

## 2019-08-23 RX ADMIN — RIVAROXABAN SCH MG: 15 TABLET, FILM COATED ORAL at 16:52

## 2019-08-23 RX ADMIN — ATORVASTATIN CALCIUM SCH MG: 10 TABLET, FILM COATED ORAL at 09:26

## 2019-08-23 RX ADMIN — DOCUSATE SODIUM,SENNOSIDES SCH TAB: 50; 8.6 TABLET, FILM COATED ORAL at 01:30

## 2019-08-23 RX ADMIN — GABAPENTIN SCH MG: 100 CAPSULE ORAL at 16:52

## 2019-08-23 RX ADMIN — DOCUSATE SODIUM,SENNOSIDES SCH TAB: 50; 8.6 TABLET, FILM COATED ORAL at 20:33

## 2019-08-23 RX ADMIN — GABAPENTIN SCH MG: 100 CAPSULE ORAL at 09:25

## 2019-08-23 RX ADMIN — DOCUSATE SODIUM SCH MG: 100 CAPSULE, LIQUID FILLED ORAL at 20:33

## 2019-08-23 NOTE — ECHO
DATE OF PROCEDURE: 08/23/2019

 

REFERRING PHYSICIAN: Ana Abrams MD

 

INDICATION: Dyspnea.

 

HEIGHT: 182 cm

WEIGHT: 81 kg

 

Dimensions

IVS: 1.1

LV: 7.1

LVPW: 1.1

LA: 4.7

Aorta: 4.2.

Left atrial volume index: 119

IVC: 2.6

 

FINDINGS

The study is of acceptable technical quality.  The patient is in atrial

fibrillation with mildly tachycardic response.

 

Left ventricle is severely dilated and severely globally hypokinetic. I estimate

ejection fraction (EF) around 20%, computer calculated left ventricular ejection

fraction (LVEF) was 24%.  Right ventricle appears dilated and hypokinetic.  Both

atria are severely enlarged.  Aortic valve is sclerotic.  It seems to have

preserved mobility of three cusps.  There are degenerative abnormalities of

mitral valve with mild mitral annular calcifications and minimal prolapse of

anterior mitral leaflet.  Tricuspid valve appears normal.  Pulmonic valve

also appears normal.  No pericardial effusion is noted.  Inferior vena cava is

dilated and has minimal appreciable collapse with respiration indicative of

likely very high central venous pressure.  Aortic root is mildly dilated at 4.2

cm.  Aortic arch and abdominal aorta were not well seen.

 

Doppler interrogation of aortic valve reveals no significant stenosis and

approximately moderate insufficiency.  There is probably severe mitral

insufficiency with very eccentric MR jet.  There is moderate tricuspid

insufficiency.  Calculated pulmonary artery pressure at minimum in high 50s

corresponding to moderately severe pulmonary hypertension.

 

Evaluation of diastolic function is inconclusive due to underlying atrial

fibrillation, but considering low tissue Doppler velocities of mitral annulus I

assume advanced diastolic dysfunction.

 

CONCLUSION

 

1.  Study is of acceptable technical quality.

2.  Severely dilated globally hypokinetic left ventricle with severe left

ventricular systolic dysfunction.

3.  Moderate aortic insufficiency.

4.  Severe mitral insufficiency.

5.  Moderate tricuspid insufficiency.

6.  High central venous pressure.

7.  At least moderately severe pulmonary hypertension.

8.  Severe biatrial enlargement.

 

COMMENT

Subacute bacterial endocarditis (SBE) prophylaxis is not recommended.  Study

results are similar to prior echocardiogram on June 1, 2019.

[facility]

## 2019-08-23 NOTE — ECGEPIP
[facility] - ED

                                       

                                       Test Date:    2019

Pat Name:     KATYA TAI          Department:   

Patient ID:   [id number]                 Room:         -

Gender:       Male                     Technician:   AR

:          1933               Requested By: ASYA WAGNER

Order Number: [id number]     Reading MD:   Ancelmo Arriola

                                 Measurements

Intervals                              Axis          

Rate:         89                       P:            

MA:           0                        QRS:          -13

QRSD:         105                      T:            -29

QT:           392                                    

QTc:          478                                    

                           Interpretive Statements

ATRIAL FIBRILLATION WITH OCCASIONAL VENTRICULAR PREMATURE COMPLEX, SIMILAR TO

 

19

UNACCEPTABLE TRACING QUALITY FOR INTERPRETATION

Electronically Signed on 2019 5:28:22 EDT by Ancelmo Arriola

## 2019-08-23 NOTE — ECGEPIP
[facility]

                                       

                                       Test Date:    2019

Pat Name:     KATYA TAI          Department:   

Patient ID:   [id number]                 Room:         [place] [address]

Gender:       Male                     Technician:   ROBBIE

:          1933               Requested By: ABEL ZAMUDIO 

Order Number: [id number]     Reading MD:   Roxana Flores

                                 Measurements

Intervals                              Axis          

Rate:         105                      P:            

VT:           0                        QRS:          -10

QRSD:         127                      T:            138

QT:           381                                    

QTc:          504                                    

                           Interpretive Statements

ATRIAL FIBRILLATION WITH RAPID VENTRICULAR RESPONSE WITH ABERRANT CONDUCTION

OR 

VENTRICULAR PREMATURE COMPLEXES

MODERATE INTRAVENTRICULAR CONDUCTION DELAY

ST DEVIATION AND MODERATE T-WAVE ABNORMALITY, CONSIDER LATERAL ISCHEMIA

SIMILAR TO 19

Electronically Signed on 2019 9:17:44 EDT by Roxana Flores

## 2019-08-23 NOTE — IPNPDOC
Subjective


Date Seen


The patient was seen on 8/23/19.





Subjective


Chief Complaint/HPI


This is an 85 yo male with multiple pmhx including sChf and afib who presented 

to the ed for sob for the past couple of weeks which has been getting worse and 

preventing patient from sleeping because of severe orthopnea which cause him to 

sleep in a 90 degrees sitting position. He also complain of headache due to lack

of sleep  b/l temporal and nonradiating. He denied change in vision, nausea with

headache or vomiting . He denied fever , chills, chest pain or cough. He said 

his symptoms have improved with torsemide, but wasn't enough. 





Patient is diagnosed with Acute chf exacerbation and is on iv lasix.He says that

he is feeling better,breathing better and asking if he can go home.However legs 

still swollen.No major overnight event reported by the nurse.


General:  Reports: Normal Appetite; 


   Denies: Chills, Night Sweats, Fatigue, Malaise


Constitutional:  Denies: Chills, Fever, Night Sweats


ENT:  Denies: Head Aches, Ear Pain, Dysphagia


Skin:  Denies: Rash, Lesions, Breakdown


Pulmonary:  Denies: Dyspnea, Cough


Cardiovascular:  Reports: Other Symptoms (legs edema still present,however the 

orthopnea has improved.)


Gastrointestinal:  Denies: Nausea, Vomiting, Abdominal Pain, Diarrhea, 

Constipation


Musculoskeletal:  Denies: Neck Pain, Back Pain, Joint Pain, Muscle Pain, Spasms


Neurological:  Denies: Weakness, Numbness, Change in speech, Confusion


Psych:  Reports: Mood Normal; 


   Denies: Depression, Memory Issues





Objective


Physical Examination


General Exam:  Positive: Alert, No Acute Distress


Eye Exam:  Positive: PERRLA, Conjunctiva & lids normal, EOMI; 


   Negative: Sclera icteric


ENT Exam:  Positive: Atraumatic, Mucous membr. moist/pink, Pharynx Normal


Neck Exam:  Positive: Supple; 


   Negative: JVD, thyromegaly


Chest Exam:  Positive: Other (Few crackles at the bases,the rest of lungs clear)


Heart Exam:  Positive: Rate Normal, Regular Rhythm, Normal S1, Normal S2; 


   Negative: Murmurs, Rubs


Abdomen Exam:  Positive: Normal bowel sounds, Soft; 


   Negative: Tenderness, Hepatospenomegaly


Extremity Exam:  Positive: Edema (2+)


Skin Exam:  Positive: Nl turgor and temperature; 


   Negative: Rash, Breakdown


Neuro Exam:  Positive: Normal Gait, Normal Speech, Cranial Nerves 3-12 NL, Ref

lexes 2+


Psych Exam:  Positive: Mental status NL, Mood NL, Oriented x 3





Assessment /Plan


Problems





(1) Acute on chronic systolic CHF (congestive heart failure)


Problem Text:  -Short of breath improved,however patient still has significant 

legs edema requiring iv lasix.Patient was on Torsemide 20 mg po daily on 

outpatient and 20 mg was an increased dose by pcp in recent days.


-On lasix 60 mg iv q 12 hrs,however will adjust to 60 mg iv daily.Good 

diuresis,I/O balance today -1270.


-Continue I and Os,daily weight,low salt diet and fluid restriction.ECHO done 

however results pending.


-Pt is on BB,however not on ACEI/ARBs likely due to CKD3


-Continue other related cardiac medications.











(2) A-fib


Status:  Chronic


Problem Text:  Controlled.On BB and Xarelto





(3) Hypertension


Status:  Chronic


Problem Text:  Controlled.On metoprolol and lasix





(4) Hypothyroid


Status:  Chronic


Problem Text:  Continue synthroid.Follow TSH





(5) CKD (chronic kidney disease), stage III


Status:  Chronic


Problem Text:  There is slight increase of creatinine today.Will closely monitor

with patient on iv lasix.Dose of lasix has been adjusted to 60 mg iv daily 

instead of BID





(6) Hyperlipidemia


Status:  Chronic


Problem Text:  On statin.





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


Onset Date:  ~ 2017      Status:  Acute


Problem Text:  Monitor.There is drop from 9.2 POA to 8.2.If Hgb<7.0 consider 

transfusion.








Plan/VTE


VTE Prophylaxis Ordered?:  Yes (on Xarelto)





Plan


Therapy:  PT


Advance Directives:  Other Advance Directive (discussed with patient and wife 

and patient wants to be full code for now)





Disposition


1-2 days.





VS, I&O, 24H, Fishbone


Vital Signs/I&O





Vital Signs








  Date Time  Temp Pulse Resp B/P (MAP) Pulse Ox O2 Delivery O2 Flow Rate FiO2


 


8/23/19 10:00  100 20 127/78 (94) 96   


 


8/23/19 08:00 97.0       


 


8/22/19 20:35      Room Air  














I&O- Last 24 Hours up to 6 AM 


 


 8/23/19





 06:00


 


Intake Total 240 ml


 


Output Total 1400 ml


 


Balance -1160 ml











Laboratory Data


24H LABS


Laboratory Tests 2


8/22/19 16:59: 


Immature Granulocyte % (Auto) 0.3, White Blood Count 3.1L, Red Blood Count 

2.63L, Hemoglobin 9.2L, Hematocrit 28.9L, Mean Corpuscular Volume 109.9H, Mean 

Corpuscular Hemoglobin 35.0H, Mean Corpuscular Hemoglobin Concent 31.8L, Red 

Cell Distribution Width 19.1H, Platelet Count 105L, Neutrophils (%) (Auto) 49.8,

Lymphocytes (%) (Auto) 19.7L, Monocytes (%) (Auto) 29.2H, Eosinophils (%) (Auto)

0.3, Basophils (%) (Auto) 0.7, Neutrophils # (Auto) 1.5L, Lymphocytes # (Auto) 

0.6L, Monocytes # (Auto) 0.9H, Eosinophils # (Auto) 0.0, Basophils # (Auto) 0.0,

Nucleated Red Blood Cells % (auto) 0.0, Prothrombin Time 18.4H, Prothromb Time 

International Ratio 1.56, Anion Gap 8, Glomerular Filtration Rate 36.2, Blood 

Urea Nitrogen 25H, Creatinine 1.89H, Sodium Level 142, Potassium Level 3.4L, 

Chloride Level 107, Carbon Dioxide Level 27, Calcium Level 9.6, Aspartate Amino 

Transf (AST/SGOT) 18, Alanine Aminotransferase (ALT/SGPT) 22, Total Creatine 

Kinase 169, Alkaline Phosphatase 103, Total Bilirubin 1.0, Total Protein 7.6, 

Albumin 3.8, Creatine Kinase MB 6.5H, Creatine Kinase MB Relative Index 3.85, 

Troponin I < 0.02, NT-Pro-B-Type Natriuretic Peptide 02055N, Albumin/Globulin 

Ratio 1.00, Lipase 191


8/23/19 05:21: 


Nucleated Red Blood Cells % (auto) 0.0, Anion Gap 6L, Glomerular Filtration Rate

35.3, Blood Urea Nitrogen 28H, Creatinine 1.93H, Sodium Level 142, Potassium 

Level 4.0, Chloride Level 108H, Carbon Dioxide Level 28, Calcium Level 9.1, 

Phosphorus Level 3.3, Magnesium Level 2.5H


CBC/BMP


Laboratory Tests


8/22/19 16:59








Red Blood Count 2.63 L, Mean Corpuscular Volume 109.9 H, Mean Corpuscular 

Hemoglobin 35.0 H, Mean Corpuscular Hemoglobin Concent 31.8 L, Red Cell 

Distribution Width 19.1 H, Neutrophils (%) (Auto) 49.8, Lymphocytes (%) (Auto) 

19.7 L, Monocytes (%) (Auto) 29.2 H, Eosinophils (%) (Auto) 0.3, Basophils (%) 

(Auto) 0.7, Neutrophils # (Auto) 1.5 L, Lymphocytes # (Auto) 0.6 L, Monocytes # 

(Auto) 0.9 H, Eosinophils # (Auto) 0.0, Basophils # (Auto) 0.0, Calcium Level 

9.6, Aspartate Amino Transf (AST/SGOT) 18, Alanine Aminotransferase (ALT/SGPT) 

22, Total Creatine Kinase 169, Alkaline Phosphatase 103, Total Bilirubin 1.0, 

Total Protein 7.6, Albumin 3.8





8/23/19 05:21








Red Blood Count 2.40 L, Mean Corpuscular Volume 107.5 H, Mean Corpuscular 

Hemoglobin 34.2 H, Mean Corpuscular Hemoglobin Concent 31.8 L, Red Cell 

Distribution Width 18.9 H, Calcium Level 9.1











FARHAN CARR MD           Aug 23, 2019 13:03

## 2019-08-24 VITALS — DIASTOLIC BLOOD PRESSURE: 83 MMHG | SYSTOLIC BLOOD PRESSURE: 143 MMHG

## 2019-08-24 VITALS — SYSTOLIC BLOOD PRESSURE: 140 MMHG | DIASTOLIC BLOOD PRESSURE: 70 MMHG

## 2019-08-24 VITALS — DIASTOLIC BLOOD PRESSURE: 73 MMHG | SYSTOLIC BLOOD PRESSURE: 137 MMHG

## 2019-08-24 LAB
BUN SERPL-MCNC: 32 MG/DL (ref 7–18)
CALCIUM SERPL-MCNC: 9.3 MG/DL (ref 8.8–10.2)
CHLORIDE SERPL-SCNC: 107 MEQ/L (ref 98–107)
CO2 SERPL-SCNC: 27 MEQ/L (ref 21–32)
CREAT SERPL-MCNC: 2.03 MG/DL (ref 0.7–1.3)
GFR SERPL CREATININE-BSD FRML MDRD: 33.3 ML/MIN/{1.73_M2} (ref 35–?)
GLUCOSE SERPL-MCNC: 117 MG/DL (ref 70–100)
HCT VFR BLD AUTO: 26.2 % (ref 42–52)
HGB BLD-MCNC: 8.3 G/DL (ref 13.5–17.5)
MCH RBC QN AUTO: 33.9 PG (ref 27–33)
MCHC RBC AUTO-ENTMCNC: 31.7 G/DL (ref 32–36.5)
MCV RBC AUTO: 106.9 FL (ref 80–96)
PLATELET # BLD AUTO: 101 10^3/UL (ref 150–450)
POTASSIUM SERPL-SCNC: 3.7 MEQ/L (ref 3.5–5.1)
RBC # BLD AUTO: 2.45 10^6/UL (ref 4.3–6.1)
SODIUM SERPL-SCNC: 141 MEQ/L (ref 136–145)
WBC # BLD AUTO: 4 10^3/UL (ref 4–10)

## 2019-08-24 RX ADMIN — ATORVASTATIN CALCIUM SCH MG: 10 TABLET, FILM COATED ORAL at 08:50

## 2019-08-24 RX ADMIN — DOCUSATE SODIUM SCH MG: 100 CAPSULE, LIQUID FILLED ORAL at 08:50

## 2019-08-24 RX ADMIN — LEVOTHYROXINE SODIUM SCH MCG: 50 TABLET ORAL at 05:23

## 2019-08-24 RX ADMIN — METOPROLOL SUCCINATE SCH MG: 25 TABLET, EXTENDED RELEASE ORAL at 08:50

## 2019-08-24 RX ADMIN — RIVAROXABAN SCH MG: 15 TABLET, FILM COATED ORAL at 17:00

## 2019-08-24 RX ADMIN — GABAPENTIN SCH MG: 100 CAPSULE ORAL at 08:47

## 2019-08-24 RX ADMIN — DOCUSATE SODIUM,SENNOSIDES SCH TAB: 50; 8.6 TABLET, FILM COATED ORAL at 20:02

## 2019-08-24 RX ADMIN — DOCUSATE SODIUM SCH MG: 100 CAPSULE, LIQUID FILLED ORAL at 20:02

## 2019-08-24 RX ADMIN — FOLIC ACID SCH MG: 1 TABLET ORAL at 13:17

## 2019-08-24 RX ADMIN — POTASSIUM CHLORIDE SCH MEQ: 750 TABLET, EXTENDED RELEASE ORAL at 13:18

## 2019-08-24 RX ADMIN — DEXTROSE MONOHYDRATE SCH MG: 50 INJECTION, SOLUTION INTRAVENOUS at 08:47

## 2019-08-24 RX ADMIN — GABAPENTIN SCH MG: 100 CAPSULE ORAL at 16:59

## 2019-08-24 NOTE — ECGEPIP
[facility]

                                       

                                       Test Date:    2019

Pat Name:     KATYA TAI          Department:   

Patient ID:   [id number]                 Room:         [place] [id number]

Gender:       Male                     Technician:   samuel

:          1933               Requested By: ABEL ZAMUDIO 

Order Number: [id number]     Reading MD:   Roxana Flores

                                 Measurements

Intervals                              Axis          

Rate:         91                       P:            

MI:           0                        QRS:          -21

QRSD:         130                      T:            152

QT:           401                                    

QTc:          494                                    

                           Interpretive Statements

ATRIAL FIBRILLATION

MODERATE INTRAVENTRICULAR CONDUCTION DELAY

MODERATE VOLTAGE CRITERIA FOR LVH

ST DEVIATION AND MODERATE T-WAVE ABNORMALITY, CONSIDER ISCHEMIA, LVH

SIMILAR TO 19

Electronically Signed on 2019 14:41:14 EDT by Roxana Flores

## 2019-08-24 NOTE — IPNPDOC
Text Note


Date of Service


The patient was seen on 8/24/19.





NOTE


SUBJECTIVE:


Mr. Willett is an 86-year-old male who presented with worsening shortness of 

breath. He is admitted with what appears to be acute on chronic systolic 

congestive heart failure. He does not complain of shortness of breath currently 

and appears to be improving. He still has some lower extremity edema.





OBJECTIVE:


See vital signs below


HENT: Neck is supple with no adenopathy or thyromegaly, oral mucosa is moist


Cardiovascular: Irregular at times, rate and rhythm, no appreciable murmur.


Respiratory: Clear to auscultation. No rales or crackles.


Abdominal: Soft, nontender, nondistended.


Extremities: Does have 1-2+ pitting edema to legs.


Neuro: No notable focal neuromotor or sensory deficit.


Skin: Patient does have some scaly lesions to his face and head that resemble 

squamous cell lesions.





ECHOCARDIOGRAM


Echocardiogram shows global hypokinesis. Ejection fraction is estimated for 20-

24%. There is biatrial enlargement. There are findings consistent with severe 

pulmonary hypertension. Note is also made of severe mitral insufficiency.





ASSESSMENT/PLAN:





This is an 86-year-old male with acute exacerbation of chronic systolic 

congestive heart failure. He has remarkable peripheral edema. Patient also has 

remarkable cardiomegaly, but no interstitial infiltrate or rales. We will 

continue with his current level of diuresis with IV Lasix; at home he is on 

torsemide. The patient is asking about whether he will benefit from use of 

compression stockings. It won't hurt to try them. We are hopefully being able to

discharge him to home tomorrow after additional diuresis.





VS,Fishbone, I+O


VS, Fishbone, I+O


Laboratory Tests


8/24/19 05:25








Red Blood Count 2.45 L, Mean Corpuscular Volume 106.9 H, Mean Corpuscular 

Hemoglobin 33.9 H, Mean Corpuscular Hemoglobin Concent 31.7 L, Red Cell 

Distribution Width 19.2 H, Calcium Level 9.3








Vital Signs








  Date Time  Temp Pulse Resp B/P (MAP) Pulse Ox O2 Delivery O2 Flow Rate FiO2


 


8/24/19 08:50  97  118/63    


 


8/24/19 06:00 97.6  16  95   


 


8/22/19 20:35      Room Air  














I&O- Last 24 Hours up to 6 AM 


 


 8/24/19





 06:00


 


Intake Total 480 ml


 


Output Total 825 ml


 


Balance -345 ml

















ABI RAMIREZ MD         Aug 24, 2019 13:12

## 2019-08-25 VITALS — DIASTOLIC BLOOD PRESSURE: 69 MMHG | SYSTOLIC BLOOD PRESSURE: 128 MMHG

## 2019-08-25 VITALS — SYSTOLIC BLOOD PRESSURE: 117 MMHG | DIASTOLIC BLOOD PRESSURE: 67 MMHG

## 2019-08-25 LAB
BUN SERPL-MCNC: 31 MG/DL (ref 7–18)
CALCIUM SERPL-MCNC: 8.6 MG/DL (ref 8.8–10.2)
CHLORIDE SERPL-SCNC: 107 MEQ/L (ref 98–107)
CO2 SERPL-SCNC: 28 MEQ/L (ref 21–32)
CREAT SERPL-MCNC: 1.93 MG/DL (ref 0.7–1.3)
GFR SERPL CREATININE-BSD FRML MDRD: 35.3 ML/MIN/{1.73_M2} (ref 35–?)
GLUCOSE SERPL-MCNC: 107 MG/DL (ref 70–100)
POTASSIUM SERPL-SCNC: 3.5 MEQ/L (ref 3.5–5.1)
SODIUM SERPL-SCNC: 143 MEQ/L (ref 136–145)

## 2019-08-25 RX ADMIN — POTASSIUM CHLORIDE SCH MEQ: 750 TABLET, EXTENDED RELEASE ORAL at 07:54

## 2019-08-25 RX ADMIN — ATORVASTATIN CALCIUM SCH MG: 10 TABLET, FILM COATED ORAL at 07:54

## 2019-08-25 RX ADMIN — DOCUSATE SODIUM SCH MG: 100 CAPSULE, LIQUID FILLED ORAL at 07:54

## 2019-08-25 RX ADMIN — LEVOTHYROXINE SODIUM SCH MCG: 50 TABLET ORAL at 05:41

## 2019-08-25 RX ADMIN — FOLIC ACID SCH MG: 1 TABLET ORAL at 07:54

## 2019-08-25 RX ADMIN — METOPROLOL SUCCINATE SCH MG: 25 TABLET, EXTENDED RELEASE ORAL at 07:57

## 2019-08-25 RX ADMIN — GABAPENTIN SCH MG: 100 CAPSULE ORAL at 07:54

## 2019-08-25 RX ADMIN — DEXTROSE MONOHYDRATE SCH MG: 50 INJECTION, SOLUTION INTRAVENOUS at 07:53

## 2019-08-25 NOTE — DS.PDOC
Discharge Summary


General


Date of Admission


Aug 22, 2019 at 22:33


Date of Discharge


August 25, 2019





Discharge Summary


PROCEDURES PERFORMED DURING STAY: Echocardiogram.





ADMITTING DIAGNOSES: 


1. Acute and chronic systolic and diastolic congestive heart failure.





DISCHARGE DIAGNOSES:


1. Chronic systolic and diastolic congestive heart failure, pulmonary 

hypertension,Chronic atrial fibrillation, hypothyroidism, essential 

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 

skin, chronic kidney disease stage III.





COMPLICATIONS/CHIEF COMPLAINT: Systolic And Diastolic Chf,Chronic.





HISTORY OF PRESENT ILLNESS/HOSPITAL COURSE: This is an 86-year-old male with a 

known history of systolic congestive heart failure. He had developed shortness 

of breath over the weeks prior to admission. He was unable to sleep flat and was

incredibly short of breath. Note was also made of increased lower extremity 

swelling. He was placed on torsemide and an outpatient basis, but did not 

improve sufficiently.


Patient was admitted to the telemetry floor. He was given additional diuresis 

with IV Lasix. He was quite rapidly and readily responsive. He had substantial 

reduction to his lower extremity edema and his shortness of breath resolved. We 

did monitor him for an additional night. Patient requested trial of compression 

stockings as well. This was also very helpful with resolution of his lower 

extremity edema. He was then able to be discharged to home.. 





DISCHARGE MEDICATIONS: Please see below.


 


ALLERGIES: Please see below.





PHYSICAL EXAMINATION ON DISCHARGE:


VITAL SIGNS: Please see below.





HENT: Neck is supple with no adenopathy or thyromegaly, oral mucosa is moist


Cardiovascular: Irregular at times, rate and rhythm, no appreciable murmur.


Respiratory: Clear to auscultation. No rales or crackles.


Abdominal: Soft, nontender, nondistended.


Extremities: 1-2+ pitting edema to legs is resolved, compression stockings are 

in place


Neuro: No notable focal neuromotor or sensory deficit.


Skin: Patient does have some scaly lesions to his face and head that resemble 

squamous cell lesions.


Psych: Patient's cognition, judgment and insight are remarkably intact for his 

age.








LABORATORY DATA: Please see below.





IMAGING: Echocardiogram


Echocardiogram showed ejection fraction ranging from 20-24%. There was some 

hypokinesis to the right ventricle. There was biatrial enlargement. Pulmonary 

arterial pressure of at least 50 mmHg was consistent with severe pulmonary 

hypertension. Comment was also made of advanced diastolic dysfunction.





PROGNOSIS: 





ACTIVITY: As tolerated.





DIET: Heart healthy, 2 g sodium





DISCHARGE PLAN: The patient is stable for discharge to home. He can return to 

his usual diuretic management, taking torsemide. He will continue on his rate 

control medication in the form of Toprol-XL and anticoagulation with Xarelto for

his underlying chronic atrial fibrillation. He will follow-up within a week or 

so with his primary care provider Arsenio Velez Junior





DISPOSITION: 01 Home, Self-Care.





DISCHARGE INSTRUCTIONS:


1. .





ITEMS TO FOLLOWUP ON ON OUTPATIENT:


1. .





DISCHARGE CONDITION: Stable.





TIME SPENT ON DISCHARGE: Greater than 40 minutes.





Vital Signs/I&Os





Vital Signs








  Date Time  Temp Pulse Resp B/P (MAP) Pulse Ox O2 Delivery O2 Flow Rate FiO2


 


8/25/19 07:57  101  117/67    


 


8/25/19 06:00 97.0  18  95   


 


8/22/19 20:35      Room Air  














I&O- Last 24 Hours up to 6 AM 


 


 8/25/19





 06:00


 


Intake Total 870 ml


 


Output Total 1875 ml


 


Balance -1005 ml











Laboratory Data


Labs 24H


Laboratory Tests 2


8/25/19 05:47: 


Anion Gap 8, Glomerular Filtration Rate 35.3, Blood Urea Nitrogen 31H, C

reatinine 1.93H, Sodium Level 143, Potassium Level 3.5, Chloride Level 107, 

Carbon Dioxide Level 28, Calcium Level 8.6L


CBC/BMP


Laboratory Tests


8/25/19 05:47








Calcium Level 8.6 L





Discharge Medications


Scheduled


Atorvastatin Calcium (Atorvastatin Calcium) 10 Mg Tab, 10 MG PO DAILY, 

(Reported)


   TAKES AT 1400 


Diphenhydramine HCl (Benadryl) 25 Mg Capsule, 25 MG PO QHS, (Reported)


Docusate Sodium (Colace) 100 Mg Capsule, 100 MG PO QHS, (Reported)


Folic Acid/Vit B Complex and C (Monalisa-Lauro Tablet) 0.8 Mg Tablet, 1 TAB PO DAILY,

(Reported)


   TAKES AT 1400 


Gabapentin (Gabapentin) 100 Mg Cap, 100 MG PO BID, (Reported)


   0900, 1700 


Iron Polysaccharide Complex (Ferrex 150) 150 Mg Capsule, 150 MG PO QPM, 

(Reported)


   TAKES AT 1700 


Levothyroxine Sodium (Synthroid) 50 Mcg Tab, 50 MCG PO DAILY, (Reported)


Metoprolol Succinate (Toprol Xl) 25 Mg Tab, 25 MG PO DAILY, (Reported)


Potassium Chloride (Klor-Con M20) 20 Meq Tab.er.prt, 20 MEQ FT DAILY


   Take 1 tab by mouth daily for 3 days. 


Rivaroxaban (Xarelto) 15 Mg Tab, 15 MG PO QPM, (Reported)


   TAKES AT 1700 


Sennosides/Docusate Sodium (Docusate Sodium-Senna Tablet) 1 Each Tablet, 1 TAB 

PO QHS, (Reported)


Torsemide (Torsemide) 20 Mg Tablet, 20 MG PO DAILY, (Reported)


Vit C/E/Zn/Coppr/Lutein/Zeaxan (Preservision Areds 2 Softgel) 1 Each Capsule, 1 

EACH PO BID, (Reported)


   0900, 1700 





Scheduled PRN


Albuterol Sulfate (Proair Respiclick) 108 Mcg/Act Aer, 2 PUFFS INH Q4H PRN for 

SHORTNESS OF BREATH, (Reported)





Allergies


Coded Allergies:  


     No Known Allergies (Unverified , 11/2/03)











ABI RAMIREZ MD         Aug 25, 2019 20:12

## 2019-08-26 LAB
ALBUMIN SERPL BCG-MCNC: 3.3 GM/DL (ref 3.2–5.2)
ALT SERPL W P-5'-P-CCNC: 20 U/L (ref 12–78)
BASOPHILS # BLD AUTO: 0 10^3/UL (ref 0–0.2)
BASOPHILS NFR BLD AUTO: 0.7 % (ref 0–1)
BILIRUB SERPL-MCNC: 0.9 MG/DL (ref 0.2–1)
BUN SERPL-MCNC: 29 MG/DL (ref 7–18)
CALCIUM SERPL-MCNC: 8.8 MG/DL (ref 8.8–10.2)
CHLORIDE SERPL-SCNC: 107 MEQ/L (ref 98–107)
CO2 SERPL-SCNC: 28 MEQ/L (ref 21–32)
CREAT SERPL-MCNC: 1.78 MG/DL (ref 0.7–1.3)
EOSINOPHIL # BLD AUTO: 0 10^3/UL (ref 0–0.5)
EOSINOPHIL NFR BLD AUTO: 0.7 % (ref 0–3)
GFR SERPL CREATININE-BSD FRML MDRD: 38.8 ML/MIN/{1.73_M2} (ref 35–?)
GLUCOSE SERPL-MCNC: 141 MG/DL (ref 70–100)
HCT VFR BLD AUTO: 25.1 % (ref 42–52)
HGB BLD-MCNC: 7.9 G/DL (ref 13.5–17.5)
LYMPHOCYTES # BLD AUTO: 0.5 10^3/UL (ref 1.5–4.5)
LYMPHOCYTES NFR BLD AUTO: 16.4 % (ref 24–44)
MCH RBC QN AUTO: 34.8 PG (ref 27–33)
MCHC RBC AUTO-ENTMCNC: 31.5 G/DL (ref 32–36.5)
MCV RBC AUTO: 110.6 FL (ref 80–96)
MONOCYTES # BLD AUTO: 0.9 10^3/UL (ref 0–0.8)
MONOCYTES NFR BLD AUTO: 31.4 % (ref 0–5)
NEUTROPHILS # BLD AUTO: 1.5 10^3/UL (ref 1.8–7.7)
NEUTROPHILS NFR BLD AUTO: 50.1 % (ref 36–66)
PLATELET # BLD AUTO: 93 10^3/UL (ref 150–450)
POTASSIUM SERPL-SCNC: 3.5 MEQ/L (ref 3.5–5.1)
PROT SERPL-MCNC: 6.4 GM/DL (ref 6.4–8.2)
RBC # BLD AUTO: 2.27 10^6/UL (ref 4.3–6.1)
SODIUM SERPL-SCNC: 144 MEQ/L (ref 136–145)
WBC # BLD AUTO: 3 10^3/UL (ref 4–10)

## 2019-09-03 ENCOUNTER — HOSPITAL ENCOUNTER (OUTPATIENT)
Dept: HOSPITAL 53 - M ONCM | Age: 84
Discharge: HOME | End: 2019-09-03
Attending: INTERNAL MEDICINE
Payer: MEDICARE

## 2019-09-03 VITALS — DIASTOLIC BLOOD PRESSURE: 72 MMHG | SYSTOLIC BLOOD PRESSURE: 115 MMHG

## 2019-09-03 VITALS — BODY MASS INDEX: 25.68 KG/M2 | WEIGHT: 189.6 LBS | HEIGHT: 72 IN

## 2019-09-03 DIAGNOSIS — Z79.01: ICD-10-CM

## 2019-09-03 DIAGNOSIS — N18.3: ICD-10-CM

## 2019-09-03 DIAGNOSIS — D46.0: Primary | ICD-10-CM

## 2019-09-03 DIAGNOSIS — I48.91: ICD-10-CM

## 2019-09-03 DIAGNOSIS — D46.9: ICD-10-CM

## 2019-09-03 DIAGNOSIS — Z79.899: ICD-10-CM

## 2019-09-03 DIAGNOSIS — I50.40: ICD-10-CM

## 2019-09-03 LAB
ALBUMIN SERPL BCG-MCNC: 3.3 GM/DL (ref 3.2–5.2)
ALT SERPL W P-5'-P-CCNC: 17 U/L (ref 12–78)
BASOPHILS # BLD AUTO: 0 10^3/UL (ref 0–0.2)
BASOPHILS NFR BLD AUTO: 0.8 % (ref 0–1)
BILIRUB SERPL-MCNC: 0.7 MG/DL (ref 0.2–1)
BUN SERPL-MCNC: 27 MG/DL (ref 7–18)
CALCIUM SERPL-MCNC: 8.6 MG/DL (ref 8.8–10.2)
CHLORIDE SERPL-SCNC: 108 MEQ/L (ref 98–107)
CO2 SERPL-SCNC: 29 MEQ/L (ref 21–32)
CREAT SERPL-MCNC: 1.77 MG/DL (ref 0.7–1.3)
EOSINOPHIL # BLD AUTO: 0 10^3/UL (ref 0–0.5)
EOSINOPHIL NFR BLD AUTO: 0.8 % (ref 0–3)
GFR SERPL CREATININE-BSD FRML MDRD: 39 ML/MIN/{1.73_M2} (ref 35–?)
GLUCOSE SERPL-MCNC: 107 MG/DL (ref 70–100)
HCT VFR BLD AUTO: 26.9 % (ref 42–52)
HGB BLD-MCNC: 8.3 G/DL (ref 13.5–17.5)
LYMPHOCYTES # BLD AUTO: 0.5 10^3/UL (ref 1.5–5)
LYMPHOCYTES NFR BLD AUTO: 17.7 % (ref 24–44)
MCH RBC QN AUTO: 33.7 PG (ref 27–33)
MCHC RBC AUTO-ENTMCNC: 30.9 G/DL (ref 32–36.5)
MCV RBC AUTO: 109.3 FL (ref 80–96)
MONOCYTES # BLD AUTO: 0.8 10^3/UL (ref 0–0.8)
MONOCYTES NFR BLD AUTO: 30.7 % (ref 0–5)
NEUTROPHILS # BLD AUTO: 1.3 10^3/UL (ref 1.5–8.5)
NEUTROPHILS NFR BLD AUTO: 49.2 % (ref 36–66)
PLATELET # BLD AUTO: 102 10^3/UL (ref 150–450)
POTASSIUM SERPL-SCNC: 3.1 MEQ/L (ref 3.5–5.1)
PROT SERPL-MCNC: 6.3 GM/DL (ref 6.4–8.2)
RBC # BLD AUTO: 2.46 10^6/UL (ref 4.3–6.1)
SODIUM SERPL-SCNC: 144 MEQ/L (ref 136–145)
WBC # BLD AUTO: 2.5 10^3/UL (ref 4–10)

## 2019-09-03 PROCEDURE — 82728 ASSAY OF FERRITIN: CPT

## 2019-09-03 PROCEDURE — 84165 PROTEIN E-PHORESIS SERUM: CPT

## 2019-09-03 PROCEDURE — 85027 COMPLETE CBC AUTOMATED: CPT

## 2019-09-03 PROCEDURE — 82270 OCCULT BLOOD FECES: CPT

## 2019-09-03 PROCEDURE — 83010 ASSAY OF HAPTOGLOBIN QUANT: CPT

## 2019-09-03 PROCEDURE — 86901 BLOOD TYPING SEROLOGIC RH(D): CPT

## 2019-09-03 PROCEDURE — 96372 THER/PROPH/DIAG INJ SC/IM: CPT

## 2019-09-03 PROCEDURE — 86880 COOMBS TEST DIRECT: CPT

## 2019-09-03 PROCEDURE — 86900 BLOOD TYPING SEROLOGIC ABO: CPT

## 2019-09-03 PROCEDURE — 83550 IRON BINDING TEST: CPT

## 2019-09-03 PROCEDURE — 82607 VITAMIN B-12: CPT

## 2019-09-03 PROCEDURE — 36415 COLL VENOUS BLD VENIPUNCTURE: CPT

## 2019-09-03 PROCEDURE — 85055 RETICULATED PLATELET ASSAY: CPT

## 2019-09-03 PROCEDURE — 80053 COMPREHEN METABOLIC PANEL: CPT

## 2019-09-03 PROCEDURE — 83883 ASSAY NEPHELOMETRY NOT SPEC: CPT

## 2019-09-03 PROCEDURE — 85049 AUTOMATED PLATELET COUNT: CPT

## 2019-09-03 PROCEDURE — 86870 RBC ANTIBODY IDENTIFICATION: CPT

## 2019-09-03 PROCEDURE — 86850 RBC ANTIBODY SCREEN: CPT

## 2019-09-03 PROCEDURE — 86920 COMPATIBILITY TEST SPIN: CPT

## 2019-09-03 PROCEDURE — 82784 ASSAY IGA/IGD/IGG/IGM EACH: CPT

## 2019-09-03 PROCEDURE — 36430 TRANSFUSION BLD/BLD COMPNT: CPT

## 2023-12-26 NOTE — HPEPDOC
[facility] Medical History & Physical


Date of Admission


Aug 22, 2019


Date of Service:  Aug 22, 2019





History and Physical


CHIEF COMPLAINT: [sob ]





HISTORY OF PRESENT ILLNESS: This is an 85 yo male with multiple pmhx including 

sChf and afib who presented to the ed for sob for the past couple of weeks which

has been getting worse and preventing patient from sleeping because of severe 

orthopnea which cause him to sleep in a 90 degrees sitting position. He also 

complain of headache due to lack of sleep  b/l temporal and nonradiating. He 

denied change in vision, nausea with headache or vomiting . He denied fever , 

chills, chest pain or cough. He said his symptoms have improved with torsemide, 

but wasn't enough. 





Medical History


1.  Chronic anemia.  No history of blood loss.


2.  Atrial fibrillation.


3.  Hypertension.


4.  Hyperlipidemia.


5.  Hypothyroid.


6.  Basal cell cancer of the skin.


7.  Squamous cell cancer of the skin.


8.  Diverticulosis of the colon.


9.  Internal hemorrhoids.


10.  Tubular adenoma of the colon.


11.  Chronic kidney disease (CKD) stage III.


12.  Congestive heart failure, systolic with ejection fraction (EF) of 45-50%.


13. Moderate Aortic regurgitation.


14. Severe Mitral regurgitation.


15.  Diastolic dysfunction.





Social History


* Smoker:  former Smoker


Alcohol:  Denies


Drugs:  denies





surgery - multiple skin surgeries due to basal cell cancer , right inguinal 

hernia repair, 





FAMILY HISTORY: noncontributory 





ALLERGIES: Please see below.





HOME MEDICATIONS: Please see below. 





ROS  - all 10 point review of system is negative except for whats listed in HPI







Physical exam 


Gen: NAD, healthy appearing , 


HEENT: normocephalic, atraumatic, no discharge from ears or nose, no or

opharyngeal erythema or exudate, neck is supple, no lymphadenopathy, trachea 

midline 


CVS: RRR, normal S1n S2, no murmur, rubs, or gallops, + 2 edema below the 

calves, no jvd 


Resp: LCTAB, no rhonchi, wheezes or crackles 


Abd : soft nontender, normal bowel sounds, no rebound tenderness or guarding 


MSK: no swelling, full range of motion, strength 5/5


Neuro: AOAx3, no confusion, no focal deficit 


Psych: normal mood and affect, good judgment





LABORATORY DATA: See below.





IMAGING: [


CXR


IMPRESSION:


 


Global cardiomegaly, but no evidence of acute cardiopulmonary disease. No


significant change from the prior exam.]





MICROBIOLOGY: Please see below. 





ASSESSMENT AND PLAN 


Chf with pEF - acute on chronic exacerbation 


-continue iv lasix 60mg bid 


-strict I and Os 


-low salt diet 


-fluid restriction 1.5L/day 


-f/u echo 


-c/w aspirin 


-f/u ekg 


-troponin x1 negative 


-probnp very elevated 


-doesnt seem  to be on ACEI//Arbs but he will likely benefit from it give ckd 

stage 3 and chf 








Afib//htn 


controlled 


c/w xarelto  and bp meds 





Hypothryoidism 


- f/u tsh 


-c/w levothyroxine 





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 


get procrit infusion q12h 


take po iron at home 


stable


continue to monitor for now 





ckd stage 3 


chronic , stable 


c/w monitor 


avoid nephrotoxic agents 





dvt ppx- on xarelto





dnr/dni 





from home





Vital Signs





Vital Signs








  Date Time  Temp Pulse Resp B/P (MAP) Pulse Ox O2 Delivery O2 Flow Rate FiO2


 


8/22/19 23:15  94 14 121/60 (80) 95   


 


8/22/19 20:35      Room Air  


 


8/22/19 16:26 98.6       











Laboratory Data


Labs 24H


Laboratory Tests 2


8/22/19 16:59: 


Immature Granulocyte % (Auto) 0.3, White Blood Count 3.1L, Red Blood Count 

2.63L, Hemoglobin 9.2L, Hematocrit 28.9L, Mean Corpuscular Volume 109.9H, Mean 

Corpuscular Hemoglobin 35.0H, Mean Corpuscular Hemoglobin Concent 31.8L, Red 

Cell Distribution Width 19.1H, Platelet Count 105L, Neutrophils (%) (Auto) 49.8,

Lymphocytes (%) (Auto) 19.7L, Monocytes (%) (Auto) 29.2H, Eosinophils (%) (Auto)

0.3, Basophils (%) (Auto) 0.7, Neutrophils # (Auto) 1.5L, Lymphocytes # (Auto) 

0.6L, Monocytes # (Auto) 0.9H, Eosinophils # (Auto) 0.0, Basophils # (Auto) 0.0,

Nucleated Red Blood Cells % (auto) 0.0, Prothrombin Time 18.4H, Prothromb Time 

International Ratio 1.56, Anion Gap 8, Glomerular Filtration Rate 36.2, Blood 

Urea Nitrogen 25H, Creatinine 1.89H, Sodium Level 142, Potassium Level 3.4L, 

Chloride Level 107, Carbon Dioxide Level 27, Calcium Level 9.6, Aspartate Amino 

Transf (AST/SGOT) 18, Alanine Aminotransferase (ALT/SGPT) 22, Total Creatine 

Kinase 169, Alkaline Phosphatase 103, Total Bilirubin 1.0, Total Protein 7.6, 

Albumin 3.8, Creatine Kinase MB 6.5H, Creatine Kinase MB Relative Index 3.85, 

Troponin I < 0.02, NT-Pro-B-Type Natriuretic Peptide 32486F, Albumin/Globulin 

Ratio 1.00, Lipase 191


CBC/BMP


Laboratory Tests


8/22/19 16:59








Red Blood Count 2.63 L, Mean Corpuscular Volume 109.9 H, Mean Corpuscular 

Hemoglobin 35.0 H, Mean Corpuscular Hemoglobin Concent 31.8 L, Red Cell 

Distribution Width 19.1 H, Neutrophils (%) (Auto) 49.8, Lymphocytes (%) (Auto) 

19.7 L, Monocytes (%) (Auto) 29.2 H, Eosinophils (%) (Auto) 0.3, Basophils (%) 

(Auto) 0.7, Neutrophils # (Auto) 1.5 L, Lymphocytes # (Auto) 0.6 L, Monocytes # 

(Auto) 0.9 H, Eosinophils # (Auto) 0.0, Basophils # (Auto) 0.0, Calcium Level 

9.6, Aspartate Amino Transf (AST/SGOT) 18, Alanine Aminotransferase (ALT/SGPT) 

22, Total Creatine Kinase 169, Alkaline Phosphatase 103, Total Bilirubin 1.0, 

Total Protein 7.6, Albumin 3.8





Home Medications


Scheduled


Atorvastatin Calcium (Atorvastatin Calcium) 10 Mg Tab, 10 MG PO DAILY


   TAKES AT 1400 


Diphenhydramine HCl (Benadryl) 25 Mg Capsule, 25 MG PO QHS


Docusate Sodium (Colace) 100 Mg Capsule, 100 MG PO QHS


Folic Acid/Vit B Complex and C (Monalisa-Lauro Tablet) 0.8 Mg Tablet, 1 TAB PO DAILY


   TAKES AT 1400 


Gabapentin (Gabapentin) 100 Mg Cap, 100 MG PO BID


   0900, 1700 


Iron Polysaccharide Complex (Ferrex 150) 150 Mg Capsule, 150 MG PO QPM


   TAKES AT 1700 


Levothyroxine Sodium (Synthroid) 50 Mcg Tab, 50 MCG PO DAILY


Metoprolol Succinate (Toprol Xl) 25 Mg Tab, 25 MG PO DAILY


Potassium Chloride (Klor-Con M20) 20 Meq Tab.er.prt, 20 MEQ FT DAILY


   Take 1 tab by mouth daily for 3 days. 


Rivaroxaban (Xarelto) 15 Mg Tab, 15 MG PO QPM


   TAKES AT 1700 


Sennosides/Docusate Sodium (Docusate Sodium-Senna Tablet) 1 Each Tablet, 1 TAB 

PO QHS


Torsemide (Torsemide) 20 Mg Tablet, 20 MG PO DAILY


Vit C/E/Zn/Coppr/Lutein/Zeaxan (Preservision Areds 2 Softgel) 1 Each Capsule, 1 

EACH PO BID


   0900, 1700 





Scheduled PRN


Albuterol Sulfate (Proair Respiclick) 108 Mcg/Act Aer, 2 PUFFS INH Q4H PRN for 

SHORTNESS OF BREATH





Allergies


Coded Allergies:  


     No Known Allergies (Unverified , 11/2/03)





A-FIB/CHADSVASC


A-FIB History


Current/History of A-Fib/PAF?:  Yes


Current PO Anticoag Therapy:  Yes





Age/Risk Factor Scoring


CHADSVASC:  








CHADSVASC Response (Comments) Value


 


Age Risk Factor Age >/= 75 years old 2


 


Gender Risk Factor Male 0


 


Hx of CHF Yes 1


 


Hx of HTN Yes 1


 


Hx of Stroke/TIA/or VTE No 0


 


Hx of Diabetes No 0


 


Hx of Vascular Disease No 0


 


Total  4











Treatment


Treatment ordered:  Rivaroxaban











ABEL ZAMUDIO MD               Aug 23, 2019 01:58 [FreeTextEntry2] : 12/26/2023 Doing PT, has continued pain left shoulder blade, numbness sanchez to left thumb but feels it in all fingers

## 2024-10-17 NOTE — HPE
DATE OF ADMISSION:  04/10/2017

 

PRIMARY CARE PROVIDER:  Dr. Velez.

 

ONCOLOGIST:  Dr. Lynch.

 

CARDIOLOGIST:  Dr. Flores.

 

GASTROENTEROLOGIST:  Dr. Muniz.

 

CHIEF COMPLAINT:  Episode of almost passing out along with chest tightness,

confusion, inability to talk and shortness of breath at around 7 p.m. on

04/09/2017.

 

PAST MEDICAL HISTORY:

1.  Chronic anemia.  No history of blood loss.

2.  Atrial fibrillation.

3.  Hypertension.

4.  Hyperlipidemia.

5.  Hypothyroid.

6.  Basal cell cancer of the skin.

7.  Squamous cell cancer of the skin.

8.  Diverticulosis of the colon.

9.  Internal hemorrhoids.

10.  Tubular adenoma of the colon.

11.  Chronic kidney disease (CKD) stage III.

12.  Congestive heart failure, systolic with ejection fraction (EF) of 45-50%.

13.  Aortic regurgitation.

14.  Mitral regurgitation.

15.  Diastolic dysfunction.

 

HISTORY OF PRESENT ILLNESS:  This is an 84-year-old gentleman who was in his

usual state of health last evening at around 7 p.m. sitting on a sofa watching

television along with his wife.  He was dozing when suddenly his wife noticed he

woke up with his eyes open, started complaining of chest pressure, had difficulty

in breathing, could not talk and his eyes rolled back and his head rolled back.

At that point, emergency medical services (EMS) was called.  As per wife, the

patient did not pass out completely, but almost passed out.  EMS found his blood

pressure to be in 80s according to wife.  Patient was brought in to the emergency

room for a syncopal episode.

 

In the emergency department (ED), patient had a CT angiography done which was

negative for pulmonary embolism, but showed bilateral moderate pleural effusions.

Patient was also noted to be in atrial fibrillation, but rate was mostly in 90s

to low 100s.  Patient's orthostatic blood pressures were checked, but there was

no orthostatic hypotension seen.  However, he was noted to be severely anemic

with a hemoglobin of 7.2.  Patient denied any history of overt bleeding.  Patient

was admitted to the hospitalist service for syncope.

 

PAST SURGICAL HISTORY:  Hernia repair.

 

ALLERGIES:  No known allergies.

 

SOCIAL HISTORY:  Patient was a smoker.  Quit many years ago.  Does not abuse

alcohol or recreational drugs.

 

FAMILY HISTORY:  Not pertinent.

 

HOME MEDICATIONS:

-  metoprolol succinate 25 mg daily, hold if pulse less than 80

-  atorvastatin 10 mg daily

-  Lasix 40 mg daily

-  Synthroid 25 mcg daily

-  multivitamins one tablet daily

-  Xarelto 15 mg at bedtime

 

REVIEW OF SYSTEMS:  All 10-point review of systems is negative except those

mentioned in history of present illness (HPI).

 

PHYSICAL EXAMINATION:

VITAL SIGNS:  Blood pressure 105/73, pulse 92, respiratory rate 18, pulse

oximetry 95% in room air, temperature 97.3.

GENERAL:  Patient awake, alert, oriented times three, lying down in bed in no

acute distress.

HEENT:  Normocephalic, atraumatic, moist mucous membranes.  Anicteric eyes.

CHEST:  There are some bibasal crackles.

CARDIOVASCULAR:  There is a systolic murmur.  S1, S2 irregular.

ABDOMEN:  Soft, nontender, bowel sounds present.

EXTREMITIES:  1+ bipedal edema.

 

LABORATORY DATA:  WBC 3.1, hemoglobin 7.2, platelets 131.

 

Sodium 142, potassium 3.5, chloride 107, bicarbonate 27, BUN 25, creatinine 1.5,

glucose 139, calcium 8.5.  Cardiac enzymes first set negative.  TSH is 8.52.

Urinalysis clean.

 

CT angiography of the chest no evidence of pulmonary embolism.  There is moderate

sized bilateral pleural effusions, moderate atherosclerosis of the thoracic

aorta.  No aneurysm.

 

ASSESSMENT AND PLAN:  This is an 84-year-old male admitted for syncope.

1.  Syncope.  Will admit the patient in progressive care unit (PCU).  Will cycle

cardiac enzymes.  Will repeat echocardiogram.  Will check orthostatic blood

pressures.  As per emergency medical services (EMS) sheet, patient's blood

pressure on their arrival was 80s; however, in the emergency room there has not

had been any documented serial hypotension.  CT angiography is negative.

 

2.  Atrial fibrillation.  Rate is controlled with metoprolol, will continue.

Will continue Xarelto.

 

3.  Acute on chronic anemia.  Patient has been worked up with an

esophagogastroduodenoscopy (EGD), colonoscopy, bone marrow biopsy last year.

There is no bleeding.  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

lymphocytosis and follows with Dr. Lynch.  Colonoscopy showed diverticulosis,

tubular adenomas and some internal hemorrhoids.  Will check fecal occult blood.

As per Dr. Lynch, patient should not be on iron.  Part of the anemia could be

related to chronic kidney disease (CKD).  Will schedule the patient for two units

of blood transfusion.

 

4.  Congestive heart failure, systolic and diastolic.  Will repeat

echocardiogram.  Will continue the patient on Lasix.

 

5.  Pancytopenia.  Will continue to monitor.  Could be related to bone marrow

problem.

 

6.  CKD stage III.  Creatinine is at baseline.

 

7.  Hypothyroidism.  Thyroid-stimulating hormone (TSH) is at 8.5; however, it is

close to previous value.  Will continue with home Synthroid.

 

8.  Hyperlipidemia.  Will continue with atorvastatin.

 

9.  Deep venous thrombosis (DVT) prophylaxis.  Will not give heparin or Lovenox

because of low hemoglobin and hematocrit (H and H) and mild thrombocytopenia.

Will continue with thromboembolism deterrent stockings (TEDS) and sequentials.
no